# Patient Record
Sex: MALE | Race: BLACK OR AFRICAN AMERICAN
[De-identification: names, ages, dates, MRNs, and addresses within clinical notes are randomized per-mention and may not be internally consistent; named-entity substitution may affect disease eponyms.]

---

## 2019-10-06 ENCOUNTER — HOSPITAL ENCOUNTER (INPATIENT)
Dept: HOSPITAL 61 - ER | Age: 49
LOS: 2 days | Discharge: HOME | DRG: 378 | End: 2019-10-08
Attending: INTERNAL MEDICINE | Admitting: INTERNAL MEDICINE
Payer: SELF-PAY

## 2019-10-06 VITALS — SYSTOLIC BLOOD PRESSURE: 167 MMHG | DIASTOLIC BLOOD PRESSURE: 91 MMHG

## 2019-10-06 VITALS — DIASTOLIC BLOOD PRESSURE: 87 MMHG | SYSTOLIC BLOOD PRESSURE: 170 MMHG

## 2019-10-06 VITALS — SYSTOLIC BLOOD PRESSURE: 145 MMHG | DIASTOLIC BLOOD PRESSURE: 71 MMHG

## 2019-10-06 VITALS — SYSTOLIC BLOOD PRESSURE: 131 MMHG | DIASTOLIC BLOOD PRESSURE: 80 MMHG

## 2019-10-06 VITALS — SYSTOLIC BLOOD PRESSURE: 142 MMHG | DIASTOLIC BLOOD PRESSURE: 74 MMHG

## 2019-10-06 VITALS — DIASTOLIC BLOOD PRESSURE: 90 MMHG | SYSTOLIC BLOOD PRESSURE: 158 MMHG

## 2019-10-06 VITALS — SYSTOLIC BLOOD PRESSURE: 125 MMHG | DIASTOLIC BLOOD PRESSURE: 70 MMHG

## 2019-10-06 VITALS — BODY MASS INDEX: 25.94 KG/M2 | WEIGHT: 185.31 LBS | HEIGHT: 71 IN

## 2019-10-06 VITALS — SYSTOLIC BLOOD PRESSURE: 154 MMHG | DIASTOLIC BLOOD PRESSURE: 91 MMHG

## 2019-10-06 DIAGNOSIS — F12.10: ICD-10-CM

## 2019-10-06 DIAGNOSIS — K26.4: Primary | ICD-10-CM

## 2019-10-06 DIAGNOSIS — D56.9: ICD-10-CM

## 2019-10-06 DIAGNOSIS — Z91.19: ICD-10-CM

## 2019-10-06 DIAGNOSIS — Z87.11: ICD-10-CM

## 2019-10-06 DIAGNOSIS — Z82.0: ICD-10-CM

## 2019-10-06 DIAGNOSIS — E87.2: ICD-10-CM

## 2019-10-06 DIAGNOSIS — D62: ICD-10-CM

## 2019-10-06 DIAGNOSIS — K29.60: ICD-10-CM

## 2019-10-06 DIAGNOSIS — D50.9: ICD-10-CM

## 2019-10-06 DIAGNOSIS — F14.10: ICD-10-CM

## 2019-10-06 LAB
ALBUMIN SERPL-MCNC: 4.3 G/DL (ref 3.4–5)
ALBUMIN/GLOB SERPL: 1 {RATIO} (ref 1–1.7)
ALP SERPL-CCNC: 74 U/L (ref 46–116)
ALT SERPL-CCNC: 13 U/L (ref 16–63)
AMPHETAMINE/METHAMPHETAMINE: (no result)
ANION GAP SERPL CALC-SCNC: 14 MMOL/L (ref 6–14)
ANISOCYTOSIS BLD QL SMEAR: PRESENT
APTT BLD: 24 SEC (ref 24–38)
APTT PPP: YELLOW S
AST SERPL-CCNC: 19 U/L (ref 15–37)
BACTERIA #/AREA URNS HPF: (no result) /HPF
BARBITURATES UR-MCNC: (no result) UG/ML
BASOPHILS # BLD AUTO: 0.1 X10^3/UL (ref 0–0.2)
BASOPHILS # BLD AUTO: 0.1 X10^3/UL (ref 0–0.2)
BASOPHILS NFR BLD: 2 % (ref 0–3)
BASOPHILS NFR BLD: 2 % (ref 0–3)
BENZODIAZ UR-MCNC: (no result) UG/L
BILIRUB SERPL-MCNC: 0.8 MG/DL (ref 0.2–1)
BILIRUB UR QL STRIP: NEGATIVE
BIZZARE CELLS: PRESENT
BUN SERPL-MCNC: 9 MG/DL (ref 8–26)
BUN/CREAT SERPL: 7 (ref 6–20)
CALCIUM SERPL-MCNC: 9.7 MG/DL (ref 8.5–10.1)
CANNABINOIDS UR-MCNC: (no result) UG/L
CHLORIDE SERPL-SCNC: 100 MMOL/L (ref 98–107)
CO2 SERPL-SCNC: 26 MMOL/L (ref 21–32)
COCAINE UR-MCNC: (no result) NG/ML
CREAT SERPL-MCNC: 1.3 MG/DL (ref 0.7–1.3)
DACRYOCYTES BLD QL SMEAR: PRESENT
EOSINOPHIL NFR BLD: 0 % (ref 0–3)
EOSINOPHIL NFR BLD: 0 % (ref 0–3)
EOSINOPHIL NFR BLD: 0 X10^3/UL (ref 0–0.7)
EOSINOPHIL NFR BLD: 0 X10^3/UL (ref 0–0.7)
ERYTHROCYTE [DISTWIDTH] IN BLOOD BY AUTOMATED COUNT: 19.4 % (ref 11.5–14.5)
ERYTHROCYTE [DISTWIDTH] IN BLOOD BY AUTOMATED COUNT: 19.4 % (ref 11.5–14.5)
ERYTHROCYTE [DISTWIDTH] IN BLOOD BY AUTOMATED COUNT: 20.9 % (ref 11.5–14.5)
FIBRINOGEN PPP-MCNC: CLEAR MG/DL
GFR SERPLBLD BASED ON 1.73 SQ M-ARVRAT: 58.2 ML/MIN
GLOBULIN SER-MCNC: 4.3 G/DL (ref 2.2–3.8)
GLUCOSE SERPL-MCNC: 109 MG/DL (ref 70–99)
HCT VFR BLD CALC: 21 % (ref 39–53)
HCT VFR BLD CALC: 22.8 % (ref 39–53)
HCT VFR BLD CALC: 23.1 % (ref 39–53)
HGB BLD-MCNC: 6.3 G/DL (ref 13–17.5)
HGB BLD-MCNC: 7.1 G/DL (ref 13–17.5)
HGB BLD-MCNC: 7.1 G/DL (ref 13–17.5)
HYALINE CASTS #/AREA URNS LPF: (no result) /HPF
HYPOCHROMIA BLD QL SMEAR: PRESENT
LIPASE: 177 U/L (ref 73–393)
LYMPHOCYTES # BLD: 1.2 X10^3/UL (ref 1–4.8)
LYMPHOCYTES # BLD: 1.3 X10^3/UL (ref 1–4.8)
LYMPHOCYTES NFR BLD AUTO: 17 % (ref 24–48)
LYMPHOCYTES NFR BLD AUTO: 17 % (ref 24–48)
MCH RBC QN AUTO: 20 PG (ref 25–35)
MCH RBC QN AUTO: 20 PG (ref 25–35)
MCH RBC QN AUTO: 21 PG (ref 25–35)
MCHC RBC AUTO-ENTMCNC: 30 G/DL (ref 31–37)
MCHC RBC AUTO-ENTMCNC: 31 G/DL (ref 31–37)
MCHC RBC AUTO-ENTMCNC: 31 G/DL (ref 31–37)
MCV RBC AUTO: 65 FL (ref 79–100)
MCV RBC AUTO: 66 FL (ref 79–100)
MCV RBC AUTO: 68 FL (ref 79–100)
METHADONE SERPL-MCNC: (no result) NG/ML
MICROCYTES BLD QL SMEAR: PRESENT
MONO #: 0.6 X10^3/UL (ref 0–1.1)
MONO #: 0.7 X10^3/UL (ref 0–1.1)
MONOCYTES NFR BLD: 10 % (ref 0–9)
MONOCYTES NFR BLD: 8 % (ref 0–9)
NEUT #: 5.2 X10^3/UL (ref 1.8–7.7)
NEUT #: 5.4 X10^3/UL (ref 1.8–7.7)
NEUTROPHILS NFR BLD AUTO: 72 % (ref 31–73)
NEUTROPHILS NFR BLD AUTO: 73 % (ref 31–73)
NITRITE UR QL STRIP: NEGATIVE
OPIATES UR-MCNC: (no result) NG/ML
PCP SERPL-MCNC: (no result) MG/DL
PH UR STRIP: 7.5 [PH]
PLATELET # BLD AUTO: 312 X10^3/UL (ref 140–400)
PLATELET # BLD AUTO: 336 X10^3/UL (ref 140–400)
PLATELET # BLD AUTO: 399 X10^3/UL (ref 140–400)
PLATELET # BLD EST: ADEQUATE 10*3/UL
POLYCHROMASIA BLD QL SMEAR: PRESENT
POTASSIUM SERPL-SCNC: 3.6 MMOL/L (ref 3.5–5.1)
PROT SERPL-MCNC: 8.6 G/DL (ref 6.4–8.2)
PROT UR STRIP-MCNC: NEGATIVE MG/DL
PROTHROMBIN TIME: 13.2 SEC (ref 11.7–14)
RBC # BLD AUTO: 3.18 X10^6/UL (ref 4.3–5.7)
RBC # BLD AUTO: 3.38 X10^6/UL (ref 4.3–5.7)
RBC # BLD AUTO: 3.49 X10^6/UL (ref 4.3–5.7)
RBC #/AREA URNS HPF: 0 /HPF (ref 0–2)
SODIUM SERPL-SCNC: 140 MMOL/L (ref 136–145)
SQUAMOUS #/AREA URNS LPF: (no result) /LPF
UROBILINOGEN UR-MCNC: 1 MG/DL
WBC # BLD AUTO: 7.3 X10^3/UL (ref 4–11)
WBC # BLD AUTO: 7.4 X10^3/UL (ref 4–11)
WBC # BLD AUTO: 8.2 X10^3/UL (ref 4–11)
WBC #/AREA URNS HPF: (no result) /HPF (ref 0–4)

## 2019-10-06 PROCEDURE — 87086 URINE CULTURE/COLONY COUNT: CPT

## 2019-10-06 PROCEDURE — 36415 COLL VENOUS BLD VENIPUNCTURE: CPT

## 2019-10-06 PROCEDURE — 74177 CT ABD & PELVIS W/CONTRAST: CPT

## 2019-10-06 PROCEDURE — 86901 BLOOD TYPING SEROLOGIC RH(D): CPT

## 2019-10-06 PROCEDURE — 86850 RBC ANTIBODY SCREEN: CPT

## 2019-10-06 PROCEDURE — 81001 URINALYSIS AUTO W/SCOPE: CPT

## 2019-10-06 PROCEDURE — 88305 TISSUE EXAM BY PATHOLOGIST: CPT

## 2019-10-06 PROCEDURE — 36430 TRANSFUSION BLD/BLD COMPNT: CPT

## 2019-10-06 PROCEDURE — C9113 INJ PANTOPRAZOLE SODIUM, VIA: HCPCS

## 2019-10-06 PROCEDURE — 83550 IRON BINDING TEST: CPT

## 2019-10-06 PROCEDURE — G0378 HOSPITAL OBSERVATION PER HR: HCPCS

## 2019-10-06 PROCEDURE — P9016 RBC LEUKOCYTES REDUCED: HCPCS

## 2019-10-06 PROCEDURE — 87040 BLOOD CULTURE FOR BACTERIA: CPT

## 2019-10-06 PROCEDURE — 30233N1 TRANSFUSION OF NONAUTOLOGOUS RED BLOOD CELLS INTO PERIPHERAL VEIN, PERCUTANEOUS APPROACH: ICD-10-PCS | Performed by: INTERNAL MEDICINE

## 2019-10-06 PROCEDURE — 85730 THROMBOPLASTIN TIME PARTIAL: CPT

## 2019-10-06 PROCEDURE — 43239 EGD BIOPSY SINGLE/MULTIPLE: CPT

## 2019-10-06 PROCEDURE — 80048 BASIC METABOLIC PNL TOTAL CA: CPT

## 2019-10-06 PROCEDURE — 83540 ASSAY OF IRON: CPT

## 2019-10-06 PROCEDURE — 85025 COMPLETE CBC W/AUTO DIFF WBC: CPT

## 2019-10-06 PROCEDURE — 83605 ASSAY OF LACTIC ACID: CPT

## 2019-10-06 PROCEDURE — 90686 IIV4 VACC NO PRSV 0.5 ML IM: CPT

## 2019-10-06 PROCEDURE — 80053 COMPREHEN METABOLIC PANEL: CPT

## 2019-10-06 PROCEDURE — 86920 COMPATIBILITY TEST SPIN: CPT

## 2019-10-06 PROCEDURE — 85610 PROTHROMBIN TIME: CPT

## 2019-10-06 PROCEDURE — 86900 BLOOD TYPING SEROLOGIC ABO: CPT

## 2019-10-06 PROCEDURE — 88342 IMHCHEM/IMCYTCHM 1ST ANTB: CPT

## 2019-10-06 PROCEDURE — 90471 IMMUNIZATION ADMIN: CPT

## 2019-10-06 PROCEDURE — 93005 ELECTROCARDIOGRAM TRACING: CPT

## 2019-10-06 PROCEDURE — 84484 ASSAY OF TROPONIN QUANT: CPT

## 2019-10-06 PROCEDURE — 96374 THER/PROPH/DIAG INJ IV PUSH: CPT

## 2019-10-06 PROCEDURE — 96375 TX/PRO/DX INJ NEW DRUG ADDON: CPT

## 2019-10-06 PROCEDURE — 96361 HYDRATE IV INFUSION ADD-ON: CPT

## 2019-10-06 PROCEDURE — 83690 ASSAY OF LIPASE: CPT

## 2019-10-06 PROCEDURE — 80307 DRUG TEST PRSMV CHEM ANLYZR: CPT

## 2019-10-06 PROCEDURE — 85027 COMPLETE CBC AUTOMATED: CPT

## 2019-10-06 RX ADMIN — BACITRACIN SCH MLS/HR: 5000 INJECTION, POWDER, FOR SOLUTION INTRAMUSCULAR at 09:41

## 2019-10-06 RX ADMIN — PANTOPRAZOLE SODIUM SCH MG: 40 INJECTION, POWDER, FOR SOLUTION INTRAVENOUS at 17:26

## 2019-10-06 RX ADMIN — BACITRACIN SCH MLS/HR: 5000 INJECTION, POWDER, FOR SOLUTION INTRAMUSCULAR at 21:17

## 2019-10-06 RX ADMIN — MORPHINE SULFATE PRN MG: 4 INJECTION, SOLUTION INTRAMUSCULAR; INTRAVENOUS at 22:29

## 2019-10-06 RX ADMIN — MORPHINE SULFATE PRN MG: 4 INJECTION, SOLUTION INTRAMUSCULAR; INTRAVENOUS at 18:16

## 2019-10-06 RX ADMIN — MORPHINE SULFATE PRN MG: 4 INJECTION, SOLUTION INTRAMUSCULAR; INTRAVENOUS at 12:40

## 2019-10-06 RX ADMIN — BACITRACIN SCH MLS/HR: 5000 INJECTION, POWDER, FOR SOLUTION INTRAMUSCULAR at 20:00

## 2019-10-06 NOTE — PDOC2
GI CONSULT


Reason For Consult:


Abdominal pain/anemia/abnormal imaging





HPI:


HPI:


48 y/o male with one year h/o intermittent LUQ "burning".  Will transiently 

improve with antacids.  Says chronically takes some antisecretories (not PPI I 

think) since diagnosed with ulcer at  ~18 years ago; does not recall mention 

of H.pylori.  History suggests had penetrating ulcer with pancreatitis 

secondary.  Increasing issues over the past 2 weeks.  Worse pc.  Some nausea and

non-bloody emesis.  Denies heartburn or dysphagia.  On CT, apparent gastric wall

thickening, unclearly real or from underdistention.  





No GB, liver history.  Occasional use of tobacco and alcohol. Denies drug use 

(but tox screen positive for THC and cocaine).  Occasional NSAID use.  Typically

free of diarrhea or constipation.  Occasional blood in stool.  Occasional 

"melena".  Wt./appetite OK.  G[facility] positive for ulcers in father.  No prior 

colonoscopy.





On CBC, microcytic anemia.





PMH:


PMH:


Apparently unremarkable otherwise.





FH:


Family History:  No pertinent hx





Social History:


ALCOHOL:  occassional


Drugs:  Cocaine, Marijuana, Other (per tox screen)





ROS:





GEN: Denies fevers, chills, sweats


HEENT: Denies blurred vision, sore throat


CV: Denies chest pain


RESP: Denies shortness of air, cough


GI: Per HPI


: Denies hematuria, dysuria


ENDO: Denies weight changes


NEURO: Denies confusion, dizziness


MSK: Denies weakness, joint pain/swelling


SKIN: Denies jaundice, pruritus





Vitals:


Vitals:





                                   Vital Signs








  Date Time  Temp Pulse Resp B/P (MAP) Pulse Ox O2 Delivery O2 Flow Rate FiO2


 


10/6/19 12:40   18  94 Room Air  


 


10/6/19 11:00 98.2 80  142/74 (96)    





 98.2       











Labs:


Labs:





Laboratory Tests








Test


 10/6/19


06:21 10/6/19


06:30 10/6/19


06:55 10/6/19


10:10


 


Urine Collection Type Unknown    


 


Urine Color Yellow    


 


Urine Clarity Clear    


 


Urine pH 7.5    


 


Urine Specific Gravity 1.020    


 


Urine Protein


 Negative mg/dL


(NEG-TRACE) 


 


 





 


Urine Glucose (UA)


 Negative mg/dL


(NEG) 


 


 





 


Urine Ketones (Stick) 40 mg/dL (NEG)    


 


Urine Blood Negative (NEG)    


 


Urine Nitrite Negative (NEG)    


 


Urine Bilirubin Negative (NEG)    


 


Urine Urobilinogen Dipstick


 1.0 mg/dL (0.2


mg/dL) 


 


 





 


Urine Leukocyte Esterase Trace (NEG)    


 


Urine RBC 0 /HPF (0-2)    


 


Urine WBC 1-4 /HPF (0-4)    


 


Urine Squamous Epithelial


Cells Few /LPF 


 


 


 





 


Urine Bacteria


 Few /HPF


(0-FEW) 


 


 





 


Urine Hyaline Casts Few /HPF    


 


Urine Mucus Mod /LPF    


 


Urine Opiates Screen Neg (NEG)    


 


Urine Methadone Screen Neg (NEG)    


 


Urine Barbiturates Neg (NEG)    


 


Urine Phencyclidine Screen Neg (NEG)    


 


Urine


Amphetamine/Methamphetamine Neg (NEG) 


 


 


 





 


Urine Benzodiazepines Screen Neg (NEG)    


 


Urine Cocaine Screen Pos (NEG)    


 


Urine Cannabinoids Screen Pos (NEG)    


 


Urine Ethyl Alcohol Neg (NEG)    


 


White Blood Count


 


 7.4 x10^3/uL


(4.0-11.0) 


 7.3 x10^3/uL


(4.0-11.0)


 


Red Blood Count


 


 3.49 x10^6/uL


(4.30-5.70) 


 3.18 x10^6/uL


(4.30-5.70)


 


Hemoglobin


 


 7.1 g/dL


(13.0-17.5) 


 6.3 g/dL


(13.0-17.5)


 


Hematocrit


 


 22.8 %


(39.0-53.0) 


 21.0 %


(39.0-53.0)


 


Mean Corpuscular Volume  65 fL ()   66 fL () 


 


Mean Corpuscular Hemoglobin  20 pg (25-35)   20 pg (25-35) 


 


Mean Corpuscular Hemoglobin


Concent 


 31 g/dL


(31-37) 


 30 g/dL


(31-37)


 


Red Cell Distribution Width


 


 19.4 %


(11.5-14.5) 


 19.4 %


(11.5-14.5)


 


Platelet Count


 


 399 x10^3/uL


(140-400) 


 336 x10^3/uL


(140-400)


 


Neutrophils (%) (Auto)  73 % (31-73)   72 % (31-73) 


 


Lymphocytes (%) (Auto)  17 % (24-48)   17 % (24-48) 


 


Monocytes (%) (Auto)  8 % (0-9)   10 % (0-9) 


 


Eosinophils (%) (Auto)  0 % (0-3)   0 % (0-3) 


 


Basophils (%) (Auto)  2 % (0-3)   2 % (0-3) 


 


Neutrophils # (Auto)


 


 5.4 x10^3/uL


(1.8-7.7) 


 5.2 x10^3/uL


(1.8-7.7)


 


Lymphocytes # (Auto)


 


 1.3 x10^3/uL


(1.0-4.8) 


 1.2 x10^3/uL


(1.0-4.8)


 


Monocytes # (Auto)


 


 0.6 x10^3/uL


(0.0-1.1) 


 0.7 x10^3/uL


(0.0-1.1)


 


Eosinophils # (Auto)


 


 0.0 x10^3/uL


(0.0-0.7) 


 0.0 x10^3/uL


(0.0-0.7)


 


Basophils # (Auto)


 


 0.1 x10^3/uL


(0.0-0.2) 


 0.1 x10^3/uL


(0.0-0.2)


 


Platelet Estimate


 


 Adequate


(ADEQUATE) 


 





 


Polychromasia  Present   


 


Hypochromasia  Present   


 


Anisocytosis  Present   


 


Microcytosis  Present   


 


Tear Drop Cells  Present   


 


RBC Morphology Bizarre Forms  Present   


 


Prothrombin Time


 


 13.2 SEC


(11.7-14.0) 


 





 


Prothromb Time International


Ratio 


 1.0 (0.8-1.1) 


 


 





 


Activated Partial


Thromboplast Time 


 24 SEC (24-38) 


 


 





 


Sodium Level


 


 140 mmol/L


(136-145) 


 





 


Potassium Level


 


 3.6 mmol/L


(3.5-5.1) 


 





 


Chloride Level


 


 100 mmol/L


() 


 





 


Carbon Dioxide Level


 


 26 mmol/L


(21-32) 


 





 


Anion Gap  14 (6-14)   


 


Blood Urea Nitrogen  9 mg/dL (8-26)   


 


Creatinine


 


 1.3 mg/dL


(0.7-1.3) 


 





 


Estimated GFR


(Cockcroft-Gault) 


 58.2 


 


 





 


BUN/Creatinine Ratio  7 (6-20)   


 


Glucose Level


 


 109 mg/dL


(70-99) 


 





 


Calcium Level


 


 9.7 mg/dL


(8.5-10.1) 


 





 


Total Bilirubin


 


 0.8 mg/dL


(0.2-1.0) 


 





 


Aspartate Amino Transf


(AST/SGOT) 


 19 U/L (15-37) 


 


 





 


Alanine Aminotransferase


(ALT/SGPT) 


 13 U/L (16-63) 


 


 





 


Alkaline Phosphatase


 


 74 U/L


() 


 





 


Troponin I Quantitative


 


 < 0.017 ng/mL


(0.000-0.055) 


 





 


Total Protein


 


 8.6 g/dL


(6.4-8.2) 


 





 


Albumin


 


 4.3 g/dL


(3.4-5.0) 


 





 


Albumin/Globulin Ratio  1.0 (1.0-1.7)   


 


Lipase


 


 177 U/L


() 


 





 


Lactic Acid Level


 


 


 2.7 mmol/L


(0.4-2.0) 1.8 mmol/L


(0.4-2.0)











Allergies:


Coded Allergies:  


     No Known Drug Allergies (Unverified , 10/6/19)





Medications:





Current Medications








 Medications


  (Trade)  Dose


 Ordered  Sig/Taryn


 Route


 PRN Reason  Start Time


 Stop Time Status Last Admin


Dose Admin


 


 Fentanyl Citrate


  (Fentanyl 2ml


 Vial)  50 mcg  1X  ONCE


 IV


   10/6/19 07:00


 10/6/19 07:01 DC 10/6/19 07:00





 


 Ondansetron HCl


  (Zofran)  4 mg  1X  ONCE


 IV


   10/6/19 07:00


 10/6/19 07:01 DC 10/6/19 07:47





 


 Sodium Chloride  1,000 ml @ 


 1,000 mls/hr  1X  ONCE


 IV


   10/6/19 07:00


 10/6/19 07:59 DC 10/6/19 08:25





 


 Sodium Chloride  1,000 ml @ 


 1,000 mls/hr  1X  ONCE


 IV


   10/6/19 07:00


 10/6/19 07:59 DC 10/6/19 09:42





 


 Iohexol


  (Omnipaque 300


 Mg/ml)  60 ml  1X  ONCE


 IV


   10/6/19 07:15


 10/6/19 07:16 DC 10/6/19 07:15





 


 Fentanyl Citrate


  (Fentanyl 2ml


 Vial)  50 mcg  PRN Q2HR  PRN


 IV


 PAIN  10/6/19 08:00


 10/6/19 12:00 DC 10/6/19 09:46





 


 Sodium Chloride  1,000 ml @ 


 150 mls/hr  Q6H40M


 IV


   10/6/19 07:57


 10/7/19 07:56  10/6/19 09:41





 


 Pantoprazole


 Sodium


  (PROTONIX VIAL


 for IV PUSH)  40 mg  1X  ONCE


 IVP


   10/6/19 08:00


 10/6/19 08:06 DC 10/6/19 12:40





 


 Morphine Sulfate


  (Morphine


 Sulfate)  4 mg  PRN Q2HR  PRN


 IV


 PAIN  10/6/19 09:00


    10/6/19 12:40














Imaging:


Imaging:


ON CT:





FINDINGS:


Heart is normal in size. No pericardial or pleural effusion. Clear lung 


bases. Liver, spleen, gallbladder, pancreas, adrenals and kidneys are 


within normal limits. No enlarged retroperitoneal or pelvic adenopathy. No


free pelvic fluid or ascites. The prostate and seminal vesicles show no 


large mass. No bowel obstruction. Normal appendix. Gastric wall thickening


noted measuring 2.0 cm. Urinary bladder demonstrates no radiopaque stone. 


No pneumoperitoneum. No suspicious bony lesion.


 


IMPRESSION:


1. Wall thickening of the stomach may be secondary to suboptimal 


distention or gastritis.


2. No cholelithiasis area no nephrolithiasis.





PE:





GEN: NAD


HEENT: Atraumatic, PERRLA


LUNGS: CTAB


HEART: RRR, no murmurs


ABD: NABS, S/ND/tender LUQ and near epigastrium, no masses


EXTREMITY: No edema


SKIN: No rashes, no jaundice.  Tattoos.


NEURO/PSYCH: A & O 3





A/P:


A/P:


IMP: LUQ pain; roughly seems peptic.  Abnormal stomach on CT may or may not be 

real.  Hypertrophic gastropathy from H.pylori (or at worse, carcinoma or 

lymphoma) possible if real, especially with h/o ulcer.  


        Microcytic anemia; implies some chronic blood loss if iron deficient 

(checking).  Thalassemia if normal irons a consideration.


        CRC screening, never.





REC: IV PPI


         Continue other.


         Await iron studies.


         EGD tomorrow.


         At some point, colonoscopy.











KEIRY EL MD           Oct 6, 2019 13:01

## 2019-10-06 NOTE — RAD
PQRS Compliance statement:

 

One or more of the following individualized dose reduction techniques were

utilized for this examination:

1. Automated exposure control.

2. Adjustment of the mA and/or kV according to patient size.

3. Use of iterative reconstruction technique.

 

Indication:Epigastric pain for 3 days.

 

TECHNIQUE: CT abdomen and pelvis with IV contrast with multiplanar 

reformats.

 

COMPARISON: None

 

FINDINGS:

Heart is normal in size. No pericardial or pleural effusion. Clear lung 

bases. Liver, spleen, gallbladder, pancreas, adrenals and kidneys are 

within normal limits. No enlarged retroperitoneal or pelvic adenopathy. No

free pelvic fluid or ascites. The prostate and seminal vesicles show no 

large mass. No bowel obstruction. Normal appendix. Gastric wall thickening

noted measuring 2.0 cm. Urinary bladder demonstrates no radiopaque stone. 

No pneumoperitoneum. No suspicious bony lesion.

 

IMPRESSION:

1. Wall thickening of the stomach may be secondary to suboptimal 

distention or gastritis.

2. No cholelithiasis area no nephrolithiasis.

 

Electronically signed by: Josh Robb DO (10/6/2019 7:32 AM) [facility]

## 2019-10-06 NOTE — PHYS DOC
Past Medical History


Past Medical History:  Other


Additional Past Medical Histor:  pancreatitis





Adult General


Chief Complaint


Chief Complaint:  ABDOMINAL PAIN





HPI


HPI





Patient is a 49  year old male who presents with complaining of abdominal pain. 

Patient complaining of epigastric burning pain that started 5 days ago as an 

intermittent pain with radiation to his back associated with nausea and 2 or 3 

episodes of vomiting  per day. Patient states the pain became constant for the 

last 3 days without vomiting. Patient rated his pain 10 over 10 and complaining 

of shortness of breath without chest pain, fever and chills, sick contacts. 

Patient states he had decrease of urine output for the last couple days. Patient

complaining of 2 episodes of nonbloody diarrhea for the last 2 weeks without new

change the last few days. Patient states he had the same pain several years ago 

and diagnosed with pancreatitis. Patient denies using alcohol or drugs.





Review of Systems


Review of Systems





Constitutional: Denies fever or chills []


Eyes: Denies change in visual acuity, redness, or eye pain []


HENT: Denies nasal congestion or sore throat []


Respiratory: Denies cough or shortness of breath []


Cardiovascular: No additional information not addressed in HPI []


GI: Reports abdominal pain, nausea, vomiting,  diarrhea []


: Denies dysuria or hematuria []


Musculoskeletal: Denies back pain or joint pain []


Integument: Denies rash or skin lesions []


Neurologic: Denies headache, focal weakness or sensory changes []


Endocrine: Denies polyuria or polydipsia []





All other systems were reviewed and found to be within normal limits, except as 

documented in this note.





Current Medications


Current Medications





Current Medications








 Medications


  (Trade)  Dose


 Ordered  Sig/Taryn  Start Time


 Stop Time Status Last Admin


Dose Admin


 


 Ceftriaxone Sodium


  (Rocephin)  1 gm  1X  ONCE  10/6/19 07:45


 10/6/19 07:46 DC 10/6/19 17:27


1 GM


 


 Fentanyl Citrate


  (Fentanyl 2ml


 Vial)  50 mcg  1X  ONCE  10/6/19 07:00


 10/6/19 07:01 DC 10/6/19 07:00


50 MCG


 


 Info


  (CONTRAST GIVEN


 -- Rx MONITORING)  1 each  PRN DAILY  PRN  10/6/19 07:30


 10/8/19 07:29   





 


 Iohexol


  (Omnipaque 300


 Mg/ml)  60 ml  1X  ONCE  10/6/19 07:15


 10/6/19 07:16 DC 10/6/19 07:15


60 ML


 


 Ondansetron HCl


  (Zofran)  4 mg  1X  ONCE  10/6/19 07:00


 10/6/19 07:01 DC 10/6/19 07:47


4 MG


 


 Sodium Chloride  1,000 ml @ 


 150 mls/hr  Q6H40M  10/6/19 07:57


 10/7/19 07:56  10/6/19 09:41


150 MLS/HR


 


 Vancomycin HCl  250 ml @ 


 250 mls/hr  1X  ONCE  10/6/19 07:45


 10/6/19 08:44 DC 10/6/19 17:27


250 MLS/HR











Allergies


Allergies





Allergies








Coded Allergies Type Severity Reaction Last Updated Verified


 


  No Known Drug Allergies    10/6/19 No











Physical Exam


Physical Exam








Constitutional: Well developed, well nourished, moderate distress, non-toxic 

appearance. []


HENT: Normocephalic, atraumatic, dry oral mucosa.


Eyes: PERRLA, EOMI, conjunctiva normal, no discharge. [] 


Neck: Normal range of motion, no tenderness, supple, no stridor. [] 


Cardiovascular:Heart rate regular rhythm, no murmur []


Lungs & Thorax:  Bilateral breath sounds clear to auscultation []


Abdomen: Bowel sounds are hyperactive, soft, no tenderness, no masses, no 

pulsatile masses. [] 


Skin: Warm, dry, no erythema, no rash. [] 


Back: No tenderness, no CVA tenderness. [] 


Extremities: No tenderness, no cyanosis, no clubbing, ROM intact, no edema. [] 


Neurologic: Alert and oriented X 3, no focal deficits noted. []


Psychologic: Affect normal, judgement normal, mood normal. []





Current Patient Data


Vital Signs





                                   Vital Signs








  Date Time  Temp Pulse Resp B/P (MAP) Pulse Ox O2 Delivery O2 Flow Rate FiO2


 


10/6/19 07:30  80 20  100 Room Air  


 


10/6/19 07:00    164/101 (122)    


 


10/6/19 06:30 98.7       





 98.7       








Lab Values





                                Laboratory Tests








Test


 10/6/19


06:21 10/6/19


06:30 10/6/19


06:55


 


Urine Collection Type Unknown    


 


Urine Color Yellow    


 


Urine Clarity Clear    


 


Urine pH 7.5    


 


Urine Specific Gravity 1.020    


 


Urine Protein


 Negative mg/dL


(NEG-TRACE) 


 





 


Urine Glucose (UA)


 Negative mg/dL


(NEG) 


 





 


Urine Ketones (Stick)


 40 mg/dL (NEG)


 


 





 


Urine Blood


 Negative (NEG)


 


 





 


Urine Nitrite


 Negative (NEG)


 


 





 


Urine Bilirubin


 Negative (NEG)


 


 





 


Urine Urobilinogen Dipstick


 1.0 mg/dL (0.2


mg/dL) 


 





 


Urine Leukocyte Esterase Trace (NEG)    


 


Urine RBC 0 /HPF (0-2)    


 


Urine WBC


 1-4 /HPF (0-4)


 


 





 


Urine Squamous Epithelial


Cells Few /LPF  


 


 





 


Urine Bacteria


 Few /HPF


(0-FEW) 


 





 


Urine Hyaline Casts Few /HPF    


 


Urine Mucus Mod /LPF    


 


Urine Opiates Screen Neg (NEG)    


 


Urine Methadone Screen Neg (NEG)    


 


Urine Barbiturates Neg (NEG)    


 


Urine Phencyclidine Screen Neg (NEG)    


 


Urine


Amphetamine/Methamphetamine Neg (NEG)  


 


 





 


Urine Benzodiazepines Screen Neg (NEG)    


 


Urine Cocaine Screen Pos (NEG)    


 


Urine Cannabinoids Screen Pos (NEG)    


 


Urine Ethyl Alcohol Neg (NEG)    


 


White Blood Count


 


 7.4 x10^3/uL


(4.0-11.0) 





 


Red Blood Count


 


 3.49 x10^6/uL


(4.30-5.70)  L 





 


Hemoglobin


 


 7.1 g/dL


(13.0-17.5)  L 





 


Hematocrit


 


 22.8 %


(39.0-53.0)  L 





 


Mean Corpuscular Volume


 


 65 fL ()


L 





 


Mean Corpuscular Hemoglobin


 


 20 pg (25-35)


L 





 


Mean Corpuscular Hemoglobin


Concent 


 31 g/dL


(31-37) 





 


Red Cell Distribution Width


 


 19.4 %


(11.5-14.5)  H 





 


Platelet Count


 


 399 x10^3/uL


(140-400) 





 


Neutrophils (%) (Auto)  73 % (31-73)   


 


Lymphocytes (%) (Auto)  17 % (24-48)  L 


 


Monocytes (%) (Auto)  8 % (0-9)   


 


Eosinophils (%) (Auto)  0 % (0-3)   


 


Basophils (%) (Auto)  2 % (0-3)   


 


Neutrophils # (Auto)


 


 5.4 x10^3/uL


(1.8-7.7) 





 


Lymphocytes # (Auto)


 


 1.3 x10^3/uL


(1.0-4.8) 





 


Monocytes # (Auto)


 


 0.6 x10^3/uL


(0.0-1.1) 





 


Eosinophils # (Auto)


 


 0.0 x10^3/uL


(0.0-0.7) 





 


Basophils # (Auto)


 


 0.1 x10^3/uL


(0.0-0.2) 





 


Platelet Estimate


 


 Adequate


(ADEQUATE) 





 


Polychromasia  Present   


 


Hypochromasia  Present   


 


Anisocytosis  Present   


 


Microcytosis  Present   


 


Tear Drop Cells  Present   


 


RBC Morphology Bizarre Forms  Present   


 


Prothrombin Time


 


 13.2 SEC


(11.7-14.0) 





 


Prothrombin Time INR  1.0 (0.8-1.1)   


 


Activated Partial


Thromboplast Time 


 24 SEC (24-38)


 





 


Sodium Level


 


 140 mmol/L


(136-145) 





 


Potassium Level


 


 3.6 mmol/L


(3.5-5.1) 





 


Chloride Level


 


 100 mmol/L


() 





 


Carbon Dioxide Level


 


 26 mmol/L


(21-32) 





 


Anion Gap  14 (6-14)   


 


Blood Urea Nitrogen


 


 9 mg/dL (8-26)


 





 


Creatinine


 


 1.3 mg/dL


(0.7-1.3) 





 


Estimated GFR


(Cockcroft-Gault) 


 58.2  


 





 


BUN/Creatinine Ratio  7 (6-20)   


 


Glucose Level


 


 109 mg/dL


(70-99)  H 





 


Calcium Level


 


 9.7 mg/dL


(8.5-10.1) 





 


Iron Level


 


 7 ug/dL


()  L 





 


Total Iron Binding Capacity


 


 480 ug/dL


(250-450)  H 





 


Iron Saturation  1 % (15-34)  L 


 


Total Bilirubin


 


 0.8 mg/dL


(0.2-1.0) 





 


Aspartate Amino Transferase


(AST) 


 19 U/L (15-37)


 





 


Alanine Aminotransferase (ALT)


 


 13 U/L (16-63)


L 





 


Alkaline Phosphatase


 


 74 U/L


() 





 


Troponin I Quantitative


 


 < 0.017 ng/mL


(0.000-0.055) 





 


Total Protein


 


 8.6 g/dL


(6.4-8.2)  H 





 


Albumin


 


 4.3 g/dL


(3.4-5.0) 





 


Albumin/Globulin Ratio  1.0 (1.0-1.7)   


 


Lipase


 


 177 U/L


() 





 


Lactic Acid Level


 


 


 2.7 mmol/L


(0.4-2.0)  H





                                Laboratory Tests


10/6/19 06:30








                                Laboratory Tests


10/6/19 06:30











EKG


EKG


EKG interpreted by me. EKG at 0 845 showed normal sinus rhythm at rate of 82, 

left tolbert axis, prolonged QT at 422, no acute ST and T-wave abnormalities.





Radiology/Procedures


Radiology/Procedures


[][facility]


                    [address] [place] [address]


                                 [phone]


                                        


                                 IMAGING REPORT





                                     Signed





PATIENT: ROBINSON AGUILA    ACCOUNT: [id number]     MRN#: [id number]


: 1970           LOCATION: ER              AGE: 49


SEX: M                    EXAM DT: 10/06/19         ACCESSION#: [id number]


STATUS: PRE ER            ORD. PHYSICIAN: BANDAR GARCIA MD


REASON: EPIGASTRIC PAIN X 3 DAYS


PROCEDURE: CT ABD PELV W/ IV CONTRST ONLY





PQRS Compliance statement:


 


One or more of the following individualized dose reduction techniques were


utilized for this examination:


1. Automated exposure control.


2. Adjustment of the mA and/or kV according to patient size.


3. Use of iterative reconstruction technique.


 


Indication:Epigastric pain for 3 days.


 


TECHNIQUE: CT abdomen and pelvis with IV contrast with multiplanar 


reformats.


 


COMPARISON: None


 


FINDINGS:


Heart is normal in size. No pericardial or pleural effusion. Clear lung 


bases. Liver, spleen, gallbladder, pancreas, adrenals and kidneys are 


within normal limits. No enlarged retroperitoneal or pelvic adenopathy. No


free pelvic fluid or ascites. The prostate and seminal vesicles show no 


large mass. No bowel obstruction. Normal appendix. Gastric wall thickening


noted measuring 2.0 cm. Urinary bladder demonstrates no radiopaque stone. 


No pneumoperitoneum. No suspicious bony lesion.


 


IMPRESSION:


1. Wall thickening of the stomach may be secondary to suboptimal 


distention or gastritis.


2. No cholelithiasis area no nephrolithiasis.


 


Electronically signed by: Josh Robb DO (10/6/2019 7:32 AM) [id number]














DICTATED and SIGNED BY:     JOSH ROBB DO


DATE:     10/06/19 0732





Course & Med Decision Making


Course & Med Decision Making


Pertinent Labs and Imaging studies reviewed. (See chart for details)





Evaluation of patient in ER showed 49-year-old male patient with complaining of 

abdominal pain for several days and diarrhea for 2 weeks. Patient was pale and 

had tachycardia that improved with rest. Labs showed hemoglobin of 7.1. Patient 

treated with IV fluids for dehydration and pain medication and felt better. CT 

showed thickening of gastric area. There is possibility of gastric malignancy 

with chronic anemia. Patient later on stated  that he had bright red blood in 

his stool for 2 years.Patient requiring admission for further evaluation and 

treatment. Discussed with Dr. Rico who is in agreement with admission. 

Discussed findings and plan with patient and family, who acknowledge und

erstanding and agreement.





Dragon Disclaimer


Dragon Disclaimer


This electronic medical record was generated, in whole or in part, using a voice

 recognition dictation system.





Departure


Departure


Impression:  


   Primary Impression:  


   Abdominal pain


   Additional Impressions:  


   Anemia


   Diarrhea


   Cocaine abuse


   Marijuana abuse


Disposition:   ADMITTED AS INPATIENT (0 758)


Admitting Physician:  ROC (Dr. Rico accepted admission at 0 756)


Condition:  IMPROVED





Date and Time of Reassessment


Date:  Oct 6, 2019


Time:  08:00





Fluid Challenge


Is the fluid challenge complet:  Yes


IBW Target Volume Used:  Yes


BMI > 30:  No





Vital Signs


Vital Signs:





Vital Signs








  Date Time  Temp Pulse Resp B/P (MAP) Pulse Ox O2 Delivery O2 Flow Rate FiO2


 


10/6/19 07:30  80 20  100 Room Air  


 


10/6/19 07:00    164/101 (122)    


 


10/6/19 06:30 98.7       





 98.7       








Temperature Source:  Oral





Respirations


Respiratory Effort:  Normal





Cardiovascular


Pulse Rhythm:  Regular


Heart:  Nml rate, reg. rhythm





Capillary Refil


Capillary Refill:  Rt Hand < 3 seconds





Peripheral Pulse


Pulse Location:  Radial


Pulse Strength:  Normal (2+)


Pulse Assessment Method:  NIBP





Integumentary


Skin Moisture:  Dry





Problem Qualifiers








   Primary Impression:  


   Abdominal pain


   Abdominal location:  epigastric  Qualified Codes:  R10.13 - Epigastric pain


   Additional Impressions:  


   Anemia


   Anemia type:  unspecified type  Qualified Codes:  D64.9 - Anemia, unspecified


   Diarrhea


   Diarrhea type:  unspecified type  Qualified Codes:  R19.7 - Diarrhea, 

   unspecified








BANDAR GARCIA MD              Oct 6, 2019 07:05

## 2019-10-06 NOTE — PDOC1
History and Physical


Date of Admission


Date of Admission


DATE: 10/6/19 


TIME: 08:18





Identification/Chief Complaint


Chief Complaint


Hematemesis





Source


Source:  Patient





History of Present Illness


History of Present Illness


Mr Amezquita is a 49  year old male who presents with complaining of abdominal 

pain. Patient complaining of epigastric burning pain that started 5 days ago as 

an intermittent pain with radiation to his back associated with nausea and 2 or 

3 episodes of vomiting  per day. Patient states the pain became constant for the

last 3 days without vomiting. Patient rated his pain 10 over 10 and complaining 

of shortness of breath without chest pain, fever and chills, sick contacts. 

Patient states he had decrease of urine output for the last couple days. Patient

complaining of 2 episodes of nonbloody diarrhea for the last 2 weeks without new

change the last few days. Patient states he had the same pain several years ago 

and diagnosed with pancreatitis. Patient denies using alcohol or drugs. UDS 

positive for cannabinoids and cocaine





On further review he notes he was diagnosed with ulcer at  ~18 years ago, no 

mention of H.pylori. Denies heartburn or dysphagia.  On CT, apparent gastric 

wall thickening. 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 

infrequently.





FH positive for ulcers in father, seizure in his mother and brother.  No prior 

colonoscopy.





On CBC, microcytic anemia. Hb 7.1. Lactate 2.7





Past Medical History


Cardiovascular:  No pertinent hx


Pulmonary:  No pertinent hx


CENTRAL NERVOUS SYSTEM:  Carpal Tunnel Syndrome


GI:  GI bleed, Gastritis


Heme/Onc:  No pertinent hx


Hepatobiliary:  No pertinent hx


Psych:  No pertinent hx


Rheumatologic:  No pertinent hx


Infectious disease:  No pertinent hx


ENT:  No pertinent hx


Renal/:  No pertinent hx


Endocrine:  No pertinent hx


Dermatology:  No pertinent hx





Past Surgical History


Past Surgical History:  Other (EGD)





Family History


Family History:  Other (Peptic Ulcer disease - father. Seizure disorder in 

Mother and brother)





Social History


Smoke:  No


ALCOHOL:  rare


Drugs:  Cocaine





Current Problem List


Problem List


Problems


Medical Problems:


(1) Abdominal pain


Status: Acute  





(2) Anemia


Status: Acute  





(3) Cocaine abuse


Status: Acute  





(4) Diarrhea


Status: Acute  





(5) Marijuana abuse


Status: Acute  











Current Medications


Current Medications





Current Medications


Fentanyl Citrate (Fentanyl 2ml Vial) 50 mcg 1X  ONCE IV  Last administered on 

10/6/19at 07:00;  Start 10/6/19 at 07:00;  Stop 10/6/19 at 07:01;  Status DC


Ondansetron HCl (Zofran) 4 mg 1X  ONCE IV  Last administered on 10/6/19at 07:47;

 Start 10/6/19 at 07:00;  Stop 10/6/19 at 07:01;  Status DC


Sodium Chloride 1,000 ml @  1,000 mls/hr 1X  ONCE IV ;  Start 10/6/19 at 07:00; 

Stop 10/6/19 at 07:59;  Status DC


Sodium Chloride 1,000 ml @  1,000 mls/hr 1X  ONCE IV ;  Start 10/6/19 at 07:00; 

Stop 10/6/19 at 07:59;  Status DC


Iohexol (Omnipaque 300 Mg/ml) 60 ml 1X  ONCE IV ;  Start 10/6/19 at 07:15;  Stop

10/6/19 at 07:16;  Status DC


Info (CONTRAST GIVEN -- Rx MONITORING) 1 each PRN DAILY  PRN MC SEE COMMENTS;  

Start 10/6/19 at 07:30;  Stop 10/8/19 at 07:29


Ceftriaxone Sodium (Rocephin) 1 gm 1X  ONCE IVP ;  Start 10/6/19 at 07:45;  Stop

10/6/19 at 07:46;  Status DC


Vancomycin HCl 250 ml @  250 mls/hr 1X  ONCE IV ;  Start 10/6/19 at 07:45;  Stop

10/6/19 at 08:44


Ondansetron HCl (Zofran) 4 mg PRN Q8HRS  PRN IV NAUSEA/VOMITING;  Start 10/6/19 

at 08:00;  Stop 10/6/19 at 12:00


Fentanyl Citrate (Fentanyl 2ml Vial) 50 mcg PRN Q2HR  PRN IV PAIN;  Start 

10/6/19 at 08:00;  Stop 10/6/19 at 12:00


Sodium Chloride 1,000 ml @  150 mls/hr Q6H40M IV ;  Start 10/6/19 at 07:57;  

Stop 10/7/19 at 07:56


Pantoprazole Sodium (PROTONIX VIAL for IV PUSH) 40 mg 1X  ONCE IVP ;  Start 

10/6/19 at 08:00;  Stop 10/6/19 at 08:06;  Status DC





Allergies


Allergies:  


Coded Allergies:  


     No Known Drug Allergies (Unverified , 10/6/19)





ROS


General:  YES: Fatigue, Malaise, Appetite; 


   No: Chills, Night Sweats, Other


PSYCHOLOGICAL ROS:  YES: Anxiety; 


   No: Behavioral Disorder, Concentration difficultie, Decreased libido, 

Depression, Disorientation, Hallucinations, Hostility, Irritablity, Memory 

difficulties, Mood Swings, Obsessive thoughts, Physical abuse, Sexual abuse, 

Sleep disturbances, Suicidal ideation, Other


Eyes:  No Blurry vision, No Decreased vision, No Double vision, No Dry eyes, No 

Excessive tearing, No Eye Pain, No Itchy Eyes, No Loss of vision, No 

Photophobia, No Scotomata, No Uses contacts, No Uses glasses, No Other


HEENT:  No: Heacaches, Visual Changes, Hearing change, Nasal congestion, Nasal 

discharge, Oral lesions, Sinus pain, Sore Throat, Epistaxis, Sneezing, Snoring, 

Tinnitus, Vertigo, Vocal changes, Other


ALLERGY AND IMMUNOLOGY:  No: Hives, Insect Bite Sensitivity, Itchy/Watery Eyes, 

Nasal Congestion, Post Nasal Drip, Seasonal Allergies, Other


Hematological and Lymphatic:  No: Bleeding Problems, Blood Clots, Blood 

Transfusions, Brusing, Night Sweats, Pallor, Swollen Lymph Nodes, Other


ENDOCRINE:  No: Breast Changes, Galactorrhea, Hair Pattern Changes, Hot Flashes,

Malaise/lethargy, Mood Swings, Palpitations, Polydipsia/polyuria, Skin Changes, 

Temperature Intolerance, Unexpected Weight Changes, Other


Breast:  No New/Changing Breast Lumps, No Nipple changes, No Nipple discharge, 

No Other


Respiratory:  No: Cough, Hemoptysis, Orthopnea, Pleuritic Pain, Shortness of 

breath, SOB with excertion, Sputum Changes, Stridor, Tachypnea, Wheezing, Other


Cardiovascular:  No Chest Pain, No Palpitations, No Orthopnea, No Paroxysmal 

Noc. Dyspnea, No Edema, No Lt Headedness, No Other


Gastrointestinal:  Yes Nausea, Yes Vomiting, Yes Abdominal Pain, Yes Melena; 


   No Diarrhea, No Constipation, No Hematochezia, No Other


Genitourinary:  No Dysuria, No Frequency, No Incontinence, No Hematuria, No 

Retention, No Discharge, No Urgency, No Pain, No Flank Pain, No Other, No , No ,

No , No , No , No , No 


Musculoskeletal:  No Gait Disturbance, No Joint Pain, No Joint Stiffness, No 

Joint Swelling, No Muscle Pain, No Muscular Weakness, No Pain In:, No Swelling 

In:, No Other


Neurological:  No Behavorial Changes, No Bowel/Bladder ControlChng, No 

Confusion, No Dizziness, No Gait Disturbance, No Headaches, No Impaired 

Coord/balance, No Memory Loss, No Numbness/Tingling, No Seizures, No Speech 

Problems, No Tremors, No Visual Changes, No Weakness, No Other


Skin:  No Dry Skin, No Eczema, No Hair Changes, No Lumps, No Mole Changes, No 

Mottling, No Nail Changes, No Pruritus, No Rash, No Skin Lesion Changes, No 

Other, No Acne





Physical Exam


General:  Alert, Oriented X3, Cooperative, No acute distress


HEENT:  Atraumatic, PERRLA, EOMI, Mucous membr. moist/pink


Lungs:  Clear to auscultation, Normal air movement


Heart:  S1S2, RRR, no gallops, no murmurs


Abdomen:  Normal bowel sounds, Soft, No hepatosplenomegaly, No masses, Other 

(epigastric tenderness)


Rectal Exam:  not examined


Extremities:  No clubbing, No cyanosis, No edema, Normal pulses, No 

tenderness/swelling


Skin:  No rashes, No breakdown, No significant lesion


Neuro:  Normal gait, Normal speech, Strength at 5/5 X4 ext, Normal tone, 

Sensation intact, Cranial nerves 3-12 NL, Reflexes 2+


Psych/Mental Status:  Mental status NL, Mood NL





Vitals


Vitals





Vital Signs








  Date Time  Temp Pulse Resp B/P (MAP) Pulse Ox O2 Delivery O2 Flow Rate FiO2


 


10/6/19 07:00   20     


 


10/6/19 06:30 98.7 82  156/96 (116) 100 Room Air  





 98.7       











Labs


Labs





Laboratory Tests








Test


 10/6/19


06:21 10/6/19


06:30 10/6/19


06:55


 


Urine Collection Type Unknown   


 


Urine Color Yellow   


 


Urine Clarity Clear   


 


Urine pH 7.5   


 


Urine Specific Gravity 1.020   


 


Urine Protein


 Negative mg/dL


(NEG-TRACE) 


 





 


Urine Glucose (UA)


 Negative mg/dL


(NEG) 


 





 


Urine Ketones (Stick) 40 mg/dL (NEG)   


 


Urine Blood Negative (NEG)   


 


Urine Nitrite Negative (NEG)   


 


Urine Bilirubin Negative (NEG)   


 


Urine Urobilinogen Dipstick


 1.0 mg/dL (0.2


mg/dL) 


 





 


Urine Leukocyte Esterase Trace (NEG)   


 


Urine RBC 0 /HPF (0-2)   


 


Urine WBC 1-4 /HPF (0-4)   


 


Urine Squamous Epithelial


Cells Few /LPF 


 


 





 


Urine Bacteria


 Few /HPF


(0-FEW) 


 





 


Urine Hyaline Casts Few /HPF   


 


Urine Mucus Mod /LPF   


 


Urine Opiates Screen Neg (NEG)   


 


Urine Methadone Screen Neg (NEG)   


 


Urine Barbiturates Neg (NEG)   


 


Urine Phencyclidine Screen Neg (NEG)   


 


Urine


Amphetamine/Methamphetamine Neg (NEG) 


 


 





 


Urine Benzodiazepines Screen Neg (NEG)   


 


Urine Cocaine Screen Pos (NEG)   


 


Urine Cannabinoids Screen Pos (NEG)   


 


Urine Ethyl Alcohol Neg (NEG)   


 


White Blood Count


 


 7.4 x10^3/uL


(4.0-11.0) 





 


Red Blood Count


 


 3.49 x10^6/uL


(4.30-5.70) 





 


Hemoglobin


 


 7.1 g/dL


(13.0-17.5) 





 


Hematocrit


 


 22.8 %


(39.0-53.0) 





 


Mean Corpuscular Volume  65 fL ()  


 


Mean Corpuscular Hemoglobin  20 pg (25-35)  


 


Mean Corpuscular Hemoglobin


Concent 


 31 g/dL


(31-37) 





 


Red Cell Distribution Width


 


 19.4 %


(11.5-14.5) 





 


Platelet Count


 


 399 x10^3/uL


(140-400) 





 


Neutrophils (%) (Auto)  73 % (31-73)  


 


Lymphocytes (%) (Auto)  17 % (24-48)  


 


Monocytes (%) (Auto)  8 % (0-9)  


 


Eosinophils (%) (Auto)  0 % (0-3)  


 


Basophils (%) (Auto)  2 % (0-3)  


 


Neutrophils # (Auto)


 


 5.4 x10^3/uL


(1.8-7.7) 





 


Lymphocytes # (Auto)


 


 1.3 x10^3/uL


(1.0-4.8) 





 


Monocytes # (Auto)


 


 0.6 x10^3/uL


(0.0-1.1) 





 


Eosinophils # (Auto)


 


 0.0 x10^3/uL


(0.0-0.7) 





 


Basophils # (Auto)


 


 0.1 x10^3/uL


(0.0-0.2) 





 


Prothrombin Time


 


 13.2 SEC


(11.7-14.0) 





 


Prothromb Time International


Ratio 


 1.0 (0.8-1.1) 


 





 


Activated Partial


Thromboplast Time 


 24 SEC (24-38) 


 





 


Sodium Level


 


 140 mmol/L


(136-145) 





 


Potassium Level


 


 3.6 mmol/L


(3.5-5.1) 





 


Chloride Level


 


 100 mmol/L


() 





 


Carbon Dioxide Level


 


 26 mmol/L


(21-32) 





 


Anion Gap  14 (6-14)  


 


Blood Urea Nitrogen  9 mg/dL (8-26)  


 


Creatinine


 


 1.3 mg/dL


(0.7-1.3) 





 


Estimated GFR


(Cockcroft-Gault) 


 58.2 


 





 


BUN/Creatinine Ratio  7 (6-20)  


 


Glucose Level


 


 109 mg/dL


(70-99) 





 


Calcium Level


 


 9.7 mg/dL


(8.5-10.1) 





 


Total Bilirubin


 


 0.8 mg/dL


(0.2-1.0) 





 


Aspartate Amino Transf


(AST/SGOT) 


 19 U/L (15-37) 


 





 


Alanine Aminotransferase


(ALT/SGPT) 


 13 U/L (16-63) 


 





 


Alkaline Phosphatase


 


 74 U/L


() 





 


Troponin I Quantitative


 


 < 0.017 ng/mL


(0.000-0.055) 





 


Total Protein


 


 8.6 g/dL


(6.4-8.2) 





 


Albumin


 


 4.3 g/dL


(3.4-5.0) 





 


Albumin/Globulin Ratio  1.0 (1.0-1.7)  


 


Lipase


 


 177 U/L


() 





 


Lactic Acid Level


 


 


 2.7 mmol/L


(0.4-2.0)








Laboratory Tests








Test


 10/6/19


06:21 10/6/19


06:30 10/6/19


06:55


 


Urine Collection Type Unknown   


 


Urine Color Yellow   


 


Urine Clarity Clear   


 


Urine pH 7.5   


 


Urine Specific Gravity 1.020   


 


Urine Protein


 Negative mg/dL


(NEG-TRACE) 


 





 


Urine Glucose (UA)


 Negative mg/dL


(NEG) 


 





 


Urine Ketones (Stick) 40 mg/dL (NEG)   


 


Urine Blood Negative (NEG)   


 


Urine Nitrite Negative (NEG)   


 


Urine Bilirubin Negative (NEG)   


 


Urine Urobilinogen Dipstick


 1.0 mg/dL (0.2


mg/dL) 


 





 


Urine Leukocyte Esterase Trace (NEG)   


 


Urine RBC 0 /HPF (0-2)   


 


Urine WBC 1-4 /HPF (0-4)   


 


Urine Squamous Epithelial


Cells Few /LPF 


 


 





 


Urine Bacteria


 Few /HPF


(0-FEW) 


 





 


Urine Hyaline Casts Few /HPF   


 


Urine Mucus Mod /LPF   


 


Urine Opiates Screen Neg (NEG)   


 


Urine Methadone Screen Neg (NEG)   


 


Urine Barbiturates Neg (NEG)   


 


Urine Phencyclidine Screen Neg (NEG)   


 


Urine


Amphetamine/Methamphetamine Neg (NEG) 


 


 





 


Urine Benzodiazepines Screen Neg (NEG)   


 


Urine Cocaine Screen Pos (NEG)   


 


Urine Cannabinoids Screen Pos (NEG)   


 


Urine Ethyl Alcohol Neg (NEG)   


 


White Blood Count


 


 7.4 x10^3/uL


(4.0-11.0) 





 


Red Blood Count


 


 3.49 x10^6/uL


(4.30-5.70) 





 


Hemoglobin


 


 7.1 g/dL


(13.0-17.5) 





 


Hematocrit


 


 22.8 %


(39.0-53.0) 





 


Mean Corpuscular Volume  65 fL ()  


 


Mean Corpuscular Hemoglobin  20 pg (25-35)  


 


Mean Corpuscular Hemoglobin


Concent 


 31 g/dL


(31-37) 





 


Red Cell Distribution Width


 


 19.4 %


(11.5-14.5) 





 


Platelet Count


 


 399 x10^3/uL


(140-400) 





 


Neutrophils (%) (Auto)  73 % (31-73)  


 


Lymphocytes (%) (Auto)  17 % (24-48)  


 


Monocytes (%) (Auto)  8 % (0-9)  


 


Eosinophils (%) (Auto)  0 % (0-3)  


 


Basophils (%) (Auto)  2 % (0-3)  


 


Neutrophils # (Auto)


 


 5.4 x10^3/uL


(1.8-7.7) 





 


Lymphocytes # (Auto)


 


 1.3 x10^3/uL


(1.0-4.8) 





 


Monocytes # (Auto)


 


 0.6 x10^3/uL


(0.0-1.1) 





 


Eosinophils # (Auto)


 


 0.0 x10^3/uL


(0.0-0.7) 





 


Basophils # (Auto)


 


 0.1 x10^3/uL


(0.0-0.2) 





 


Prothrombin Time


 


 13.2 SEC


(11.7-14.0) 





 


Prothromb Time International


Ratio 


 1.0 (0.8-1.1) 


 





 


Activated Partial


Thromboplast Time 


 24 SEC (24-38) 


 





 


Sodium Level


 


 140 mmol/L


(136-145) 





 


Potassium Level


 


 3.6 mmol/L


(3.5-5.1) 





 


Chloride Level


 


 100 mmol/L


() 





 


Carbon Dioxide Level


 


 26 mmol/L


(21-32) 





 


Anion Gap  14 (6-14)  


 


Blood Urea Nitrogen  9 mg/dL (8-26)  


 


Creatinine


 


 1.3 mg/dL


(0.7-1.3) 





 


Estimated GFR


(Cockcroft-Gault) 


 58.2 


 





 


BUN/Creatinine Ratio  7 (6-20)  


 


Glucose Level


 


 109 mg/dL


(70-99) 





 


Calcium Level


 


 9.7 mg/dL


(8.5-10.1) 





 


Total Bilirubin


 


 0.8 mg/dL


(0.2-1.0) 





 


Aspartate Amino Transf


(AST/SGOT) 


 19 U/L (15-37) 


 





 


Alanine Aminotransferase


(ALT/SGPT) 


 13 U/L (16-63) 


 





 


Alkaline Phosphatase


 


 74 U/L


() 





 


Troponin I Quantitative


 


 < 0.017 ng/mL


(0.000-0.055) 





 


Total Protein


 


 8.6 g/dL


(6.4-8.2) 





 


Albumin


 


 4.3 g/dL


(3.4-5.0) 





 


Albumin/Globulin Ratio  1.0 (1.0-1.7)  


 


Lipase


 


 177 U/L


() 





 


Lactic Acid Level


 


 


 2.7 mmol/L


(0.4-2.0)











Images


Images


CT abdomen - Heart is normal in size. No pericardial or pleural effusion. Clear 

lung bases. Liver, spleen, gallbladder, pancreas, adrenals and kidneys are 

within normal limits. No enlarged retroperitoneal or pelvic adenopathy. No free 

pelvic fluid or ascites. The prostate and seminal vesicles show no large mass. 

No bowel obstruction. Normal appendix. Gastric wall thickening noted measuring 

2.0 cm. Urinary bladder demonstrates no radiopaque stone. No pneumoperitoneum. 

No suspicious bony lesion.


 


IMPRESSION:


1. Wall thickening of the stomach may be secondary to suboptimal distention or 

gastritis.


2. No cholelithiasis area no nephrolithiasis.





VTE Prophylaxis Ordered


VTE Prophylaxis Devices:  Yes


VTE Pharmacological Prophylaxi:  Contraindicated





Assessment/Plan


Assessment/Plan


A/P:


Acute epigastric abdominal pain - concern for peptic ulcer disease recurrence. 

IV PPI, consult GI. Pain control. NPO


Acute blood loss anemia - with recent melena and Hb 7.1, likely peptic ulcer 

bleeding. Type and screen. repeat H&H, will transfuse if necessary if Hb < 7


Lactic acidosis - likely 2/2 blood loss, will give IVF and trend


H/o PUD - no known h pylori history





FEN - NPO


PPX - SCDs, IV PPI


FULL CODE


Dispo - inpatient for intractable abdominal pain and obvious GI bleed











GABRIEL THORPE MD         Oct 6, 2019 08:19

## 2019-10-07 VITALS — SYSTOLIC BLOOD PRESSURE: 153 MMHG | DIASTOLIC BLOOD PRESSURE: 89 MMHG

## 2019-10-07 VITALS — SYSTOLIC BLOOD PRESSURE: 129 MMHG | DIASTOLIC BLOOD PRESSURE: 79 MMHG

## 2019-10-07 VITALS — SYSTOLIC BLOOD PRESSURE: 150 MMHG | DIASTOLIC BLOOD PRESSURE: 94 MMHG

## 2019-10-07 VITALS — DIASTOLIC BLOOD PRESSURE: 49 MMHG | SYSTOLIC BLOOD PRESSURE: 93 MMHG

## 2019-10-07 VITALS — SYSTOLIC BLOOD PRESSURE: 150 MMHG | DIASTOLIC BLOOD PRESSURE: 83 MMHG

## 2019-10-07 VITALS — SYSTOLIC BLOOD PRESSURE: 134 MMHG | DIASTOLIC BLOOD PRESSURE: 78 MMHG

## 2019-10-07 VITALS — SYSTOLIC BLOOD PRESSURE: 161 MMHG | DIASTOLIC BLOOD PRESSURE: 107 MMHG

## 2019-10-07 VITALS — SYSTOLIC BLOOD PRESSURE: 122 MMHG | DIASTOLIC BLOOD PRESSURE: 68 MMHG

## 2019-10-07 VITALS — DIASTOLIC BLOOD PRESSURE: 94 MMHG | SYSTOLIC BLOOD PRESSURE: 150 MMHG

## 2019-10-07 VITALS — SYSTOLIC BLOOD PRESSURE: 148 MMHG | DIASTOLIC BLOOD PRESSURE: 81 MMHG

## 2019-10-07 VITALS — SYSTOLIC BLOOD PRESSURE: 73 MMHG

## 2019-10-07 LAB
ERYTHROCYTE [DISTWIDTH] IN BLOOD BY AUTOMATED COUNT: 21.1 % (ref 11.5–14.5)
ERYTHROCYTE [DISTWIDTH] IN BLOOD BY AUTOMATED COUNT: 21.4 % (ref 11.5–14.5)
ERYTHROCYTE [DISTWIDTH] IN BLOOD BY AUTOMATED COUNT: 22.4 % (ref 11.5–14.5)
HCT VFR BLD CALC: 22.5 % (ref 39–53)
HCT VFR BLD CALC: 22.8 % (ref 39–53)
HCT VFR BLD CALC: 23.8 % (ref 39–53)
HGB BLD-MCNC: 7 G/DL (ref 13–17.5)
HGB BLD-MCNC: 7.1 G/DL (ref 13–17.5)
HGB BLD-MCNC: 7.6 G/DL (ref 13–17.5)
MCH RBC QN AUTO: 21 PG (ref 25–35)
MCH RBC QN AUTO: 22 PG (ref 25–35)
MCH RBC QN AUTO: 22 PG (ref 25–35)
MCHC RBC AUTO-ENTMCNC: 31 G/DL (ref 31–37)
MCHC RBC AUTO-ENTMCNC: 31 G/DL (ref 31–37)
MCHC RBC AUTO-ENTMCNC: 32 G/DL (ref 31–37)
MCV RBC AUTO: 69 FL (ref 79–100)
MCV RBC AUTO: 69 FL (ref 79–100)
MCV RBC AUTO: 70 FL (ref 79–100)
PLATELET # BLD AUTO: 273 X10^3/UL (ref 140–400)
PLATELET # BLD AUTO: 286 X10^3/UL (ref 140–400)
PLATELET # BLD AUTO: 293 X10^3/UL (ref 140–400)
RBC # BLD AUTO: 3.26 X10^6/UL (ref 4.3–5.7)
RBC # BLD AUTO: 3.3 X10^6/UL (ref 4.3–5.7)
RBC # BLD AUTO: 3.38 X10^6/UL (ref 4.3–5.7)
WBC # BLD AUTO: 8 X10^3/UL (ref 4–11)
WBC # BLD AUTO: 8.4 X10^3/UL (ref 4–11)
WBC # BLD AUTO: 8.4 X10^3/UL (ref 4–11)

## 2019-10-07 PROCEDURE — 0DB98ZX EXCISION OF DUODENUM, VIA NATURAL OR ARTIFICIAL OPENING ENDOSCOPIC, DIAGNOSTIC: ICD-10-PCS

## 2019-10-07 PROCEDURE — 0DB68ZX EXCISION OF STOMACH, VIA NATURAL OR ARTIFICIAL OPENING ENDOSCOPIC, DIAGNOSTIC: ICD-10-PCS

## 2019-10-07 RX ADMIN — MORPHINE SULFATE PRN MG: 4 INJECTION, SOLUTION INTRAMUSCULAR; INTRAVENOUS at 08:54

## 2019-10-07 RX ADMIN — PANTOPRAZOLE SODIUM SCH MG: 40 INJECTION, POWDER, FOR SOLUTION INTRAVENOUS at 08:54

## 2019-10-07 RX ADMIN — SODIUM CHLORIDE, SODIUM LACTATE, POTASSIUM CHLORIDE, AND CALCIUM CHLORIDE SCH MLS/HR: .6; .31; .03; .02 INJECTION, SOLUTION INTRAVENOUS at 14:28

## 2019-10-07 RX ADMIN — MORPHINE SULFATE PRN MG: 4 INJECTION, SOLUTION INTRAMUSCULAR; INTRAVENOUS at 20:47

## 2019-10-07 RX ADMIN — MORPHINE SULFATE PRN MG: 4 INJECTION, SOLUTION INTRAMUSCULAR; INTRAVENOUS at 17:20

## 2019-10-07 RX ADMIN — SODIUM CHLORIDE, SODIUM LACTATE, POTASSIUM CHLORIDE, AND CALCIUM CHLORIDE SCH MLS/HR: .6; .31; .03; .02 INJECTION, SOLUTION INTRAVENOUS at 20:25

## 2019-10-07 RX ADMIN — BACITRACIN SCH MLS/HR: 5000 INJECTION, POWDER, FOR SOLUTION INTRAMUSCULAR at 01:32

## 2019-10-07 RX ADMIN — MORPHINE SULFATE PRN MG: 4 INJECTION, SOLUTION INTRAMUSCULAR; INTRAVENOUS at 01:31

## 2019-10-07 RX ADMIN — MORPHINE SULFATE PRN MG: 4 INJECTION, SOLUTION INTRAMUSCULAR; INTRAVENOUS at 06:41

## 2019-10-07 NOTE — PDOC
PROGRESS NOTES


History of Present Illness


History of Present Illness





Images


Images


CT abdomen - Heart is normal in size. No pericardial or pleural effusion. Clear 

lung bases. Liver, spleen, gallbladder, pancreas, adrenals and kidneys are 

within normal limits. No enlarged retroperitoneal or pelvic adenopathy. No free 

pelvic fluid or ascites. The prostate and seminal vesicles show no large mass. 

No bowel obstruction. Normal appendix. Gastric wall thickening noted measuring 

2.0 cm. Urinary bladder demonstrates no radiopaque stone. No pneumoperitoneum. 

No suspicious bony lesion.


 


IMPRESSION:


1. Wall thickening of the stomach may be secondary to suboptimal distention or 

gastritis.


2. No cholelithiasis area no nephrolithiasis.





VTE Prophylaxis Ordered


VTE Prophylaxis Devices:  Yes


VTE Pharmacological Prophylaxi:  Contraindicated





Assessment/Plan


Assessment/Plan


A/P:


Acute epigastric abdominal pain - concern for peptic ulcer disease recurrence. 

IV PPI,


 consult GI. Pain control. NPO


Acute blood loss anemia - with recent melena and Hb 7.1, likely peptic ulcer 

bleeding. 


 transfuse if necessary if Hb < 7


Lactic acidosis - likely 2/2 blood loss, will give IVF and trend


H/o PUD - no known h pylori history


cocaine abuse


noncompliance


Hypertrophic gastropathy from H.pylori (vs carcinoma or lymphoma) especially 

with h/o ulcer.  








FEN - NPO


PPX - SCDs, IV PPI


FULL CODE


GI CONSULT


Dispo - inpatient for intractable abdominal pain and obvious GI bleed


consider egd








38 min pt exam, chart review, > 50% of time spent with exam, chart review, pt 

care coordination





Vitals


Vitals





Vital Signs








  Date Time  Temp Pulse Resp B/P (MAP) Pulse Ox O2 Delivery O2 Flow Rate FiO2


 


10/7/19 07:11      Room Air  


 


10/7/19 06:41     97   


 


10/7/19 06:35 97.9 69 18 122/68 (86)    





 97.9       











Physical Exam


General:  Alert, Oriented X3, Cooperative, No acute distress


Heart:  Regular rate, Normal S1


Lungs:  Clear


Abdomen:  Normal bowel sounds, Soft, No hepatosplenomegaly, No masses, Other 

(epigastric tenderness)


Extremities:  No clubbing, No cyanosis, No edema, Normal pulses, No 

tenderness/swelling


Skin:  No rashes, No breakdown, No significant lesion





Labs


LABS





Laboratory Tests








Test


 10/6/19


21:40 10/7/19


04:10


 


White Blood Count


 8.2 x10^3/uL


(4.0-11.0) 8.4 x10^3/uL


(4.0-11.0)


 


Red Blood Count


 3.38 x10^6/uL


(4.30-5.70) 3.30 x10^6/uL


(4.30-5.70)


 


Hemoglobin


 7.1 g/dL


(13.0-17.5) 7.1 g/dL


(13.0-17.5)


 


Hematocrit


 23.1 %


(39.0-53.0) 22.8 %


(39.0-53.0)


 


Mean Corpuscular Volume 68 fL ()  69 fL () 


 


Mean Corpuscular Hemoglobin 21 pg (25-35)  22 pg (25-35) 


 


Mean Corpuscular Hemoglobin


Concent 31 g/dL


(31-37) 31 g/dL


(31-37)


 


Red Cell Distribution Width


 20.9 %


(11.5-14.5) 21.1 %


(11.5-14.5)


 


Platelet Count


 312 x10^3/uL


(140-400) 286 x10^3/uL


(140-400)











Assessment and Plan


Assessmemt and Plan


Problems


Medical Problems:


(1) Abdominal pain


Status: Acute  





(2) Anemia


Status: Acute  





(3) Cocaine abuse


Status: Acute  





(4) Diarrhea


Status: Acute  





(5) Marijuana abuse


Status: Acute  











Comment


Review of Relevant


I have reviewed the following items larissa (where applicable) has been applied.


Labs





Laboratory Tests








Test


 10/6/19


06:21 10/6/19


06:30 10/6/19


06:55 10/6/19


10:10


 


Urine Collection Type Unknown    


 


Urine Color Yellow    


 


Urine Clarity Clear    


 


Urine pH 7.5    


 


Urine Specific Gravity 1.020    


 


Urine Protein


 Negative mg/dL


(NEG-TRACE) 


 


 





 


Urine Glucose (UA)


 Negative mg/dL


(NEG) 


 


 





 


Urine Ketones (Stick) 40 mg/dL (NEG)    


 


Urine Blood Negative (NEG)    


 


Urine Nitrite Negative (NEG)    


 


Urine Bilirubin Negative (NEG)    


 


Urine Urobilinogen Dipstick


 1.0 mg/dL (0.2


mg/dL) 


 


 





 


Urine Leukocyte Esterase Trace (NEG)    


 


Urine RBC 0 /HPF (0-2)    


 


Urine WBC 1-4 /HPF (0-4)    


 


Urine Squamous Epithelial


Cells Few /LPF 


 


 


 





 


Urine Bacteria


 Few /HPF


(0-FEW) 


 


 





 


Urine Hyaline Casts Few /HPF    


 


Urine Mucus Mod /LPF    


 


Urine Opiates Screen Neg (NEG)    


 


Urine Methadone Screen Neg (NEG)    


 


Urine Barbiturates Neg (NEG)    


 


Urine Phencyclidine Screen Neg (NEG)    


 


Urine


Amphetamine/Methamphetamine Neg (NEG) 


 


 


 





 


Urine Benzodiazepines Screen Neg (NEG)    


 


Urine Cocaine Screen Pos (NEG)    


 


Urine Cannabinoids Screen Pos (NEG)    


 


Urine Ethyl Alcohol Neg (NEG)    


 


White Blood Count


 


 7.4 x10^3/uL


(4.0-11.0) 


 7.3 x10^3/uL


(4.0-11.0)


 


Red Blood Count


 


 3.49 x10^6/uL


(4.30-5.70) 


 3.18 x10^6/uL


(4.30-5.70)


 


Hemoglobin


 


 7.1 g/dL


(13.0-17.5) 


 6.3 g/dL


(13.0-17.5)


 


Hematocrit


 


 22.8 %


(39.0-53.0) 


 21.0 %


(39.0-53.0)


 


Mean Corpuscular Volume  65 fL ()   66 fL () 


 


Mean Corpuscular Hemoglobin  20 pg (25-35)   20 pg (25-35) 


 


Mean Corpuscular Hemoglobin


Concent 


 31 g/dL


(31-37) 


 30 g/dL


(31-37)


 


Red Cell Distribution Width


 


 19.4 %


(11.5-14.5) 


 19.4 %


(11.5-14.5)


 


Platelet Count


 


 399 x10^3/uL


(140-400) 


 336 x10^3/uL


(140-400)


 


Neutrophils (%) (Auto)  73 % (31-73)   72 % (31-73) 


 


Lymphocytes (%) (Auto)  17 % (24-48)   17 % (24-48) 


 


Monocytes (%) (Auto)  8 % (0-9)   10 % (0-9) 


 


Eosinophils (%) (Auto)  0 % (0-3)   0 % (0-3) 


 


Basophils (%) (Auto)  2 % (0-3)   2 % (0-3) 


 


Neutrophils # (Auto)


 


 5.4 x10^3/uL


(1.8-7.7) 


 5.2 x10^3/uL


(1.8-7.7)


 


Lymphocytes # (Auto)


 


 1.3 x10^3/uL


(1.0-4.8) 


 1.2 x10^3/uL


(1.0-4.8)


 


Monocytes # (Auto)


 


 0.6 x10^3/uL


(0.0-1.1) 


 0.7 x10^3/uL


(0.0-1.1)


 


Eosinophils # (Auto)


 


 0.0 x10^3/uL


(0.0-0.7) 


 0.0 x10^3/uL


(0.0-0.7)


 


Basophils # (Auto)


 


 0.1 x10^3/uL


(0.0-0.2) 


 0.1 x10^3/uL


(0.0-0.2)


 


Platelet Estimate


 


 Adequate


(ADEQUATE) 


 





 


Polychromasia  Present   


 


Hypochromasia  Present   


 


Anisocytosis  Present   


 


Microcytosis  Present   


 


Tear Drop Cells  Present   


 


RBC Morphology Bizarre Forms  Present   


 


Prothrombin Time


 


 13.2 SEC


(11.7-14.0) 


 





 


Prothromb Time International


Ratio 


 1.0 (0.8-1.1) 


 


 





 


Activated Partial


Thromboplast Time 


 24 SEC (24-38) 


 


 





 


Sodium Level


 


 140 mmol/L


(136-145) 


 





 


Potassium Level


 


 3.6 mmol/L


(3.5-5.1) 


 





 


Chloride Level


 


 100 mmol/L


() 


 





 


Carbon Dioxide Level


 


 26 mmol/L


(21-32) 


 





 


Anion Gap  14 (6-14)   


 


Blood Urea Nitrogen  9 mg/dL (8-26)   


 


Creatinine


 


 1.3 mg/dL


(0.7-1.3) 


 





 


Estimated GFR


(Cockcroft-Gault) 


 58.2 


 


 





 


BUN/Creatinine Ratio  7 (6-20)   


 


Glucose Level


 


 109 mg/dL


(70-99) 


 





 


Calcium Level


 


 9.7 mg/dL


(8.5-10.1) 


 





 


Iron Level


 


 7 ug/dL


() 


 





 


Total Iron Binding Capacity


 


 480 ug/dL


(250-450) 


 





 


Iron Saturation  1 % (15-34)   


 


Total Bilirubin


 


 0.8 mg/dL


(0.2-1.0) 


 





 


Aspartate Amino Transf


(AST/SGOT) 


 19 U/L (15-37) 


 


 





 


Alanine Aminotransferase


(ALT/SGPT) 


 13 U/L (16-63) 


 


 





 


Alkaline Phosphatase


 


 74 U/L


() 


 





 


Troponin I Quantitative


 


 < 0.017 ng/mL


(0.000-0.055) 


 





 


Total Protein


 


 8.6 g/dL


(6.4-8.2) 


 





 


Albumin


 


 4.3 g/dL


(3.4-5.0) 


 





 


Albumin/Globulin Ratio  1.0 (1.0-1.7)   


 


Lipase


 


 177 U/L


() 


 





 


Lactic Acid Level


 


 


 2.7 mmol/L


(0.4-2.0) 1.8 mmol/L


(0.4-2.0)


 


Test


 10/6/19


21:40 10/7/19


04:10 


 





 


White Blood Count


 8.2 x10^3/uL


(4.0-11.0) 8.4 x10^3/uL


(4.0-11.0) 


 





 


Red Blood Count


 3.38 x10^6/uL


(4.30-5.70) 3.30 x10^6/uL


(4.30-5.70) 


 





 


Hemoglobin


 7.1 g/dL


(13.0-17.5) 7.1 g/dL


(13.0-17.5) 


 





 


Hematocrit


 23.1 %


(39.0-53.0) 22.8 %


(39.0-53.0) 


 





 


Mean Corpuscular Volume 68 fL ()  69 fL ()   


 


Mean Corpuscular Hemoglobin 21 pg (25-35)  22 pg (25-35)   


 


Mean Corpuscular Hemoglobin


Concent 31 g/dL


(31-37) 31 g/dL


(31-37) 


 





 


Red Cell Distribution Width


 20.9 %


(11.5-14.5) 21.1 %


(11.5-14.5) 


 





 


Platelet Count


 312 x10^3/uL


(140-400) 286 x10^3/uL


(140-400) 


 











Laboratory Tests








Test


 10/6/19


21:40 10/7/19


04:10


 


White Blood Count


 8.2 x10^3/uL


(4.0-11.0) 8.4 x10^3/uL


(4.0-11.0)


 


Red Blood Count


 3.38 x10^6/uL


(4.30-5.70) 3.30 x10^6/uL


(4.30-5.70)


 


Hemoglobin


 7.1 g/dL


(13.0-17.5) 7.1 g/dL


(13.0-17.5)


 


Hematocrit


 23.1 %


(39.0-53.0) 22.8 %


(39.0-53.0)


 


Mean Corpuscular Volume 68 fL ()  69 fL () 


 


Mean Corpuscular Hemoglobin 21 pg (25-35)  22 pg (25-35) 


 


Mean Corpuscular Hemoglobin


Concent 31 g/dL


(31-37) 31 g/dL


(31-37)


 


Red Cell Distribution Width


 20.9 %


(11.5-14.5) 21.1 %


(11.5-14.5)


 


Platelet Count


 312 x10^3/uL


(140-400) 286 x10^3/uL


(140-400)








Microbiology


10/6/19 Blood Culture - Preliminary, Resulted


          NO GROWTH AFTER 1 DAY


Medications





Current Medications


Fentanyl Citrate (Fentanyl 2ml Vial) 50 mcg 1X  ONCE IV  Last administered on 

10/6/19at 07:00;  Start 10/6/19 at 07:00;  Stop 10/6/19 at 07:01;  Status DC


Ondansetron HCl (Zofran) 4 mg 1X  ONCE IV  Last administered on 10/6/19at 07:47;

 Start 10/6/19 at 07:00;  Stop 10/6/19 at 07:01;  Status DC


Sodium Chloride 1,000 ml @  1,000 mls/hr 1X  ONCE IV  Last administered on 

10/6/19at 08:25;  Start 10/6/19 at 07:00;  Stop 10/6/19 at 07:59;  Status DC


Sodium Chloride 1,000 ml @  1,000 mls/hr 1X  ONCE IV  Last administered on 

10/6/19at 09:42;  Start 10/6/19 at 07:00;  Stop 10/6/19 at 07:59;  Status DC


Iohexol (Omnipaque 300 Mg/ml) 60 ml 1X  ONCE IV  Last administered on 10/6/19at 

07:15;  Start 10/6/19 at 07:15;  Stop 10/6/19 at 07:16;  Status DC


Info (CONTRAST GIVEN -- Rx MONITORING) 1 each PRN DAILY  PRN MC SEE COMMENTS;  

Start 10/6/19 at 07:30;  Stop 10/8/19 at 07:29


Ceftriaxone Sodium (Rocephin) 1 gm 1X  ONCE IVP  Last administered on 10/6/19at 

17:27;  Start 10/6/19 at 07:45;  Stop 10/6/19 at 07:46;  Status DC


Vancomycin HCl 250 ml @  250 mls/hr 1X  ONCE IV  Last administered on 10/6/19at 

17:27;  Start 10/6/19 at 07:45;  Stop 10/6/19 at 08:44;  Status DC


Ondansetron HCl (Zofran) 4 mg PRN Q8HRS  PRN IV NAUSEA/VOMITING;  Start 10/6/19 

at 08:00;  Stop 10/6/19 at 12:00;  Status DC


Fentanyl Citrate (Fentanyl 2ml Vial) 50 mcg PRN Q2HR  PRN IV PAIN Last admini

stered on 10/6/19at 09:46;  Start 10/6/19 at 08:00;  Stop 10/6/19 at 12:00;  

Status DC


Sodium Chloride 1,000 ml @  150 mls/hr Q6H40M IV  Last administered on 10/7/19at

01:32;  Start 10/6/19 at 07:57;  Stop 10/7/19 at 07:56;  Status DC


Pantoprazole Sodium (PROTONIX VIAL for IV PUSH) 40 mg 1X  ONCE IVP  Last 

administered on 10/6/19at 12:40;  Start 10/6/19 at 08:00;  Stop 10/6/19 at 0

8:06;  Status DC


Morphine Sulfate (Morphine Sulfate) 4 mg PRN Q2HR  PRN IV PAIN Last administered

on 10/7/19at 08:54;  Start 10/6/19 at 09:00


Iron Sucrose 500 mg/Sodium Chloride 275 ml @  78.571 mls/ hr 1X  ONCE IV  Last 

administered on 10/6/19at 20:34;  Start 10/6/19 at 15:30;  Stop 10/6/19 at 

18:59;  Status DC


Pantoprazole Sodium (PROTONIX VIAL for IV PUSH) 40 mg DAILYAC IVP  Last 

administered on 10/7/19at 08:54;  Start 10/6/19 at 16:30


Ondansetron HCl (Zofran) 4 mg PRN Q6HRS  PRN IVP NAUSEA/VOMITING;  Start 10/6/19

at 15:30


Vitals/I & O





Vital Sign - Last 24 Hours








 10/6/19 10/6/19 10/6/19 10/6/19





 12:40 13:02 13:10 13:17


 


Temp  98.8  98.7





  98.8  98.7


 


Pulse  80  80


 


Resp 18 16 16 14


 


B/P (MAP)  167/91  154/91


 


Pulse Ox 94   


 


O2 Delivery Room Air  Room Air 


 


    





    





 10/6/19 10/6/19 10/6/19 10/6/19





 14:18 15:00 15:18 18:16


 


Temp 98.1 98.2 98.2 





 98.1 98.2 98.2 


 


Pulse 79 88 82 


 


Resp 14 16 16 14


 


B/P (MAP) 145/71 158/90 (112) 158/90 


 


Pulse Ox    99


 


O2 Delivery  Room Air  Room Air


 


    





    





 10/6/19 10/6/19 10/6/19 10/6/19





 18:46 19:00 20:00 22:29


 


Temp  98.4  





  98.4  


 


Pulse  79  


 


Resp  17  


 


B/P (MAP)  131/80 (97)  


 


Pulse Ox  97  


 


O2 Delivery Room Air Room Air Room Air Room Air


 


    





    





 10/6/19 10/7/19 10/7/19 10/7/19





 23:02 01:31 02:23 03:00


 


Temp 98.1   97.9





 98.1   97.9


 


Pulse 75   85


 


Resp 18   16


 


B/P (MAP) 125/70 (88)   129/79 (96)


 


Pulse Ox 100 100 100 96


 


O2 Delivery Room Air Room Air Room Air Room Air


 


    





    





 10/7/19 10/7/19 10/7/19 





 06:35 06:41 07:11 


 


Temp 97.9   





 97.9   


 


Pulse 69   


 


Resp 18   


 


B/P (MAP) 122/68 (86)   


 


Pulse Ox 97 97  


 


O2 Delivery Room Air Room Air Room Air 














Intake and Output   


 


 10/6/19 10/6/19 10/7/19





 15:00 23:00 07:00


 


Intake Total 323 ml 100 ml 0 ml


 


Balance 323 ml 100 ml 0 ml

















YEHUDA SOLO MD           Oct 7, 2019 11:15

## 2019-10-07 NOTE — EKG
[facility]

              [address] [place] [address]

Test Date:    2019-10-06               Test Time:    06:45:14

Pat Name:     ROBINSON AGUILA            Department:   

Patient ID:   [id number]           Room:         572 1

Gender:       M                        Technician:   

:          [id number]               Requested By: BANDAR GARCIA

Order Number: [id number]           Reading MD:   Tom Pinon MD

                                 Measurements

Intervals                              Axis          

Rate:         82                       P:            40

MO:           142                      QRS:          -10

QRSD:         78                       T:            57

QT:           422                                    

QTc:          496                                    

                           Interpretive Statements

SINUS RHYTHM



NON-SPECIFIC ST/T CHANGES

Electronically Signed On 10- 9:56:30 CDT by Tom Pinon MD

## 2019-10-07 NOTE — NUR
SW following pt for dc planning. Chart reviewed and discussed with RN. Pt lives at home and 
is self pay. GI following. No dc recommendation noted at this time. HCFS to follow to verify 
insurance status. Will continue to follow as needed.

## 2019-10-07 NOTE — PDOC4
PROCEDURE


Procedure


EGD/biopsies





Indication: abdominal pain/anemia/abnormal CT





Meds: per anesthesia





Findings:





E--Normal.  GEJ at 41cm.


G--Folds normal and pliable, not pathologic.  Biopsies antrum.


D--Deep scar/shallow active ulcer, bulb.  Biopsies second portion.





Dasia. well.





IMP: DU/scar


        Suspect will have active gastritis +/- H.pylori seen.





REC: PPI


         Await path.


         OK to feed.











KEIRY EL MD           Oct 7, 2019 14:54

## 2019-10-08 VITALS — SYSTOLIC BLOOD PRESSURE: 158 MMHG | DIASTOLIC BLOOD PRESSURE: 96 MMHG

## 2019-10-08 VITALS — DIASTOLIC BLOOD PRESSURE: 99 MMHG | SYSTOLIC BLOOD PRESSURE: 144 MMHG

## 2019-10-08 VITALS — SYSTOLIC BLOOD PRESSURE: 109 MMHG | DIASTOLIC BLOOD PRESSURE: 63 MMHG

## 2019-10-08 LAB
ANION GAP SERPL CALC-SCNC: 10 MMOL/L (ref 6–14)
BASOPHILS # BLD AUTO: 0.1 X10^3/UL (ref 0–0.2)
BASOPHILS NFR BLD: 2 % (ref 0–3)
BUN SERPL-MCNC: 4 MG/DL (ref 8–26)
CALCIUM SERPL-MCNC: 8.9 MG/DL (ref 8.5–10.1)
CHLORIDE SERPL-SCNC: 108 MMOL/L (ref 98–107)
CO2 SERPL-SCNC: 27 MMOL/L (ref 21–32)
CREAT SERPL-MCNC: 1 MG/DL (ref 0.7–1.3)
EOSINOPHIL NFR BLD: 0.2 X10^3/UL (ref 0–0.7)
EOSINOPHIL NFR BLD: 2 % (ref 0–3)
ERYTHROCYTE [DISTWIDTH] IN BLOOD BY AUTOMATED COUNT: 22.7 % (ref 11.5–14.5)
ERYTHROCYTE [DISTWIDTH] IN BLOOD BY AUTOMATED COUNT: 22.9 % (ref 11.5–14.5)
GFR SERPLBLD BASED ON 1.73 SQ M-ARVRAT: 95.3 ML/MIN
GLUCOSE SERPL-MCNC: 87 MG/DL (ref 70–99)
HCT VFR BLD CALC: 24.3 % (ref 39–53)
HCT VFR BLD CALC: 26.2 % (ref 39–53)
HGB BLD-MCNC: 7.8 G/DL (ref 13–17.5)
HGB BLD-MCNC: 8.2 G/DL (ref 13–17.5)
LYMPHOCYTES # BLD: 1.9 X10^3/UL (ref 1–4.8)
LYMPHOCYTES NFR BLD AUTO: 24 % (ref 24–48)
MCH RBC QN AUTO: 22 PG (ref 25–35)
MCH RBC QN AUTO: 23 PG (ref 25–35)
MCHC RBC AUTO-ENTMCNC: 31 G/DL (ref 31–37)
MCHC RBC AUTO-ENTMCNC: 32 G/DL (ref 31–37)
MCV RBC AUTO: 71 FL (ref 79–100)
MCV RBC AUTO: 71 FL (ref 79–100)
MONO #: 0.8 X10^3/UL (ref 0–1.1)
MONOCYTES NFR BLD: 10 % (ref 0–9)
NEUT #: 5 X10^3/UL (ref 1.8–7.7)
NEUTROPHILS NFR BLD AUTO: 62 % (ref 31–73)
PLATELET # BLD AUTO: 266 X10^3/UL (ref 140–400)
PLATELET # BLD AUTO: 272 X10^3/UL (ref 140–400)
POTASSIUM SERPL-SCNC: 3.3 MMOL/L (ref 3.5–5.1)
RBC # BLD AUTO: 3.42 X10^6/UL (ref 4.3–5.7)
RBC # BLD AUTO: 3.69 X10^6/UL (ref 4.3–5.7)
SODIUM SERPL-SCNC: 145 MMOL/L (ref 136–145)
WBC # BLD AUTO: 7.3 X10^3/UL (ref 4–11)
WBC # BLD AUTO: 8.1 X10^3/UL (ref 4–11)

## 2019-10-08 RX ADMIN — MORPHINE SULFATE PRN MG: 4 INJECTION, SOLUTION INTRAMUSCULAR; INTRAVENOUS at 01:48

## 2019-10-08 RX ADMIN — MORPHINE SULFATE PRN MG: 4 INJECTION, SOLUTION INTRAMUSCULAR; INTRAVENOUS at 07:46

## 2019-10-08 NOTE — DISCH
DISCHARGE INSTRUCTIONS


Condition on Discharge


Condition on Discharge:  Stable





Activity After Discharge


Activity Instructions for Disc:  Activity as tolerated


Lifting Instructions after Dis:  No heavy lifting, No pulling or pushing


Driving Instructions after Dis:  Do not drive today





Diet after Discharge


Diet after Discharge:  Regular





Checks after Discharge


Checks after discharge:  Check blood press - daily





Contacting the DR. after DC


Call your doctor for:  If your condition worsens











YEHUDA SOLO MD           Oct 8, 2019 13:26

## 2019-10-08 NOTE — PDOC3
Discharge Summary


Date of Admission:  Oct 6, 2019


Date of Discharge:  Oct 8, 2019


Follow-Up:  3-5 days


Admitting Diagnosis comment:





DISCHARGE DX ================











A/P:


Acute epigastric abdominal pain - ///peptic ulcer disease recurrence. IV PPI,


 consult GI. Pain control. NPO


Acute blood loss anemia - with recent melena and Hb 7.1, likely peptic ulcer 

bleeding. 


 transfuse if necessary if Hb < 7


Lactic acidosis - likely 2/2 blood loss, will give IVF and trend


H/o PUD - no known h pylori history


cocaine abuse


noncompliance


Hypertrophic gastropathy from H.pylori (vs carcinoma or lymphoma) especially 

with h/o ulcer.  








FEN - NPO


PPX - SCDs, IV PPI


FULL CODE


GI CONSULT


Dispo - inpatient for intractable abdominal pain and obvious GI bleed


egd








Indication: abdominal pain/anemia/abnormal CT





Meds: per anesthesia





Findings:





E--Normal.  GEJ at 41cm.


G--Folds normal and pliable, not pathologic.  Biopsies antrum.


D--Deep scar/shallow active ulcer, bulb.  Biopsies second portion.





Dasia. well.





IMP: DU/scar


        Suspect will have active gastritis +/- H.pylori seen.





REC: PPI


         Await path.


         OK to feed.











KEIRY EL MD 


10/07 














34 min pt exam, chart review D/C PLANNING , > 50% of time spent with exam, chart

review, pt care coordination





Vitals


Vitals





Vital Signs








  Date Time  Temp Pulse Resp B/P (MAP) Pulse Ox O2 Delivery O2 Flow Rate FiO2


 


10/8/19 08:00      Room Air  


 


10/8/19 07:00 98.0 70 16 158/96 (116) 100   





 98.0       











Physical Exam


General:  Alert, Oriented X3, Cooperative, No acute distress


Heart:  Regular rate, Normal S1


Lungs:  Clear


Abdomen:  Normal bowel sounds, Soft, No hepatosplenomegaly, No masses, 


Extremities:  No clubbing, No cyanosis, No edema, Normal pulses, No tenderness

/swelling


Skin:  No rashes, No breakdown, No significant lesion


FINAL DIAGNOSIS


Problems


Medical Problems:


(1) Abdominal pain


Status: Acute  





(2) Anemia


Status: Acute  





(3) Cocaine abuse


Status: Acute  





(4) Diarrhea


Status: Acute  





(5) Marijuana abuse


Status: Acute  








Brief Hospital Course


Mr. Amezquita  is a 49 old [sex] who presented with [GI BLEEDING  ]


CONDITION AT DISCHARGE:  Improved


Discharge Medications





Current Medications


Fentanyl Citrate (Fentanyl 2ml Vial) 50 mcg 1X  ONCE IV  Last administered on 

10/6/19at 07:00;  Start 10/6/19 at 07:00;  Stop 10/6/19 at 07:01;  Status DC


Ondansetron HCl (Zofran) 4 mg 1X  ONCE IV  Last administered on 10/6/19at 07:47;

 Start 10/6/19 at 07:00;  Stop 10/6/19 at 07:01;  Status DC


Sodium Chloride 1,000 ml @  1,000 mls/hr 1X  ONCE IV  Last administered on 

10/6/19at 08:25;  Start 10/6/19 at 07:00;  Stop 10/6/19 at 07:59;  Status DC


Sodium Chloride 1,000 ml @  1,000 mls/hr 1X  ONCE IV  Last administered on 

10/6/19at 09:42;  Start 10/6/19 at 07:00;  Stop 10/6/19 at 07:59;  Status DC


Iohexol (Omnipaque 300 Mg/ml) 60 ml 1X  ONCE IV  Last administered on 10/6/19at 

07:15;  Start 10/6/19 at 07:15;  Stop 10/6/19 at 07:16;  Status DC


Info (CONTRAST GIVEN -- Rx MONITORING) 1 each PRN DAILY  PRN MC SEE COMMENTS;  

Start 10/6/19 at 07:30;  Stop 10/8/19 at 07:29;  Status DC


Ceftriaxone Sodium (Rocephin) 1 gm 1X  ONCE IVP  Last administered on 10/6/19at 

17:27;  Start 10/6/19 at 07:45;  Stop 10/6/19 at 07:46;  Status DC


Vancomycin HCl 250 ml @  250 mls/hr 1X  ONCE IV  Last administered on 10/6/19at 

17:27;  Start 10/6/19 at 07:45;  Stop 10/6/19 at 08:44;  Status DC


Ondansetron HCl (Zofran) 4 mg PRN Q8HRS  PRN IV NAUSEA/VOMITING;  Start 10/6/19 

at 08:00;  Stop 10/6/19 at 12:00;  Status DC


Fentanyl Citrate (Fentanyl 2ml Vial) 50 mcg PRN Q2HR  PRN IV PAIN Last admi

nistered on 10/6/19at 09:46;  Start 10/6/19 at 08:00;  Stop 10/6/19 at 12:00;  

Status DC


Sodium Chloride 1,000 ml @  150 mls/hr Q6H40M IV  Last administered on 10/7/19at

01:32;  Start 10/6/19 at 07:57;  Stop 10/7/19 at 07:56;  Status DC


Pantoprazole Sodium (PROTONIX VIAL for IV PUSH) 40 mg 1X  ONCE IVP  Last 

administered on 10/6/19at 12:40;  Start 10/6/19 at 08:00;  Stop 10/6/19 at 

08:06;  Status DC


Morphine Sulfate (Morphine Sulfate) 4 mg PRN Q2HR  PRN IV PAIN Last administered

on 10/8/19at 07:46;  Start 10/6/19 at 09:00


Iron Sucrose 500 mg/Sodium Chloride 275 ml @  78.571 mls/ hr 1X  ONCE IV  Last 

administered on 10/6/19at 20:34;  Start 10/6/19 at 15:30;  Stop 10/6/19 at 

18:59;  Status DC


Pantoprazole Sodium (PROTONIX VIAL for IV PUSH) 40 mg DAILYAC IVP  Last 

administered on 10/7/19at 08:54;  Start 10/6/19 at 16:30;  Stop 10/7/19 at 

14:56;  Status DC


Ondansetron HCl (Zofran) 4 mg PRN Q6HRS  PRN IVP NAUSEA/VOMITING;  Start 10/6/19

at 15:30


Midazolam HCl (Versed) 2 mg PRN 1X  PRN IV PRIOR TO PROCEDURE;  Start 10/7/19 at

14:30;  Stop 10/8/19 at 14:29


Fentanyl Citrate (Fentanyl 2ml Vial) 25 mcg PRN Q5MIN  PRN IV X 2 DOSES FOR 

PAIN;  Start 10/7/19 at 14:30;  Stop 10/8/19 at 14:29


Fentanyl Citrate (Fentanyl 2ml Vial) 50 mcg PRN Q5MIN  PRN IV X 2 DOSES FOR 

PAIN;  Start 10/7/19 at 14:30;  Stop 10/8/19 at 14:29


Ringer's Solution 1,000 ml @  125 mls/hr Q8H IV ;  Start 10/7/19 at 14:28;  Stop

10/8/19 at 02:27;  Status DC


Lidocaine HCl (Xylocaine-Mpf 1% 2ml Vial) 2 ml 1X PRN  PRN ID IV START;  Start 

10/7/19 at 14:30;  Stop 10/8/19 at 14:29


Propofol 40 ml @ As Directed STK-MED ONCE IV ;  Start 10/7/19 at 14:37;  Stop 

10/7/19 at 14:37;  Status DC


Lidocaine HCl (Lidocaine Pf 2% Vial) 5 ml STK-MED ONCE .ROUTE ;  Start 10/7/19 

at 14:37;  Stop 10/7/19 at 14:37;  Status DC


Pantoprazole Sodium (Protonix) 40 mg DAILYAC PO  Last administered on 10/8/19at 

07:45;  Start 10/8/19 at 07:30


Influenza Virus Vaccine Quadrival (Afluria Quad 2019-20 (3yr Up) Syringe) 0.5 ml

ONCE ONCE VAX IM  Last administered on 10/8/19at 11:32;  Start 10/8/19 at 12:00;

 Stop 10/8/19 at 12:01;  Status DC


Acetaminophen/ Hydrocodone Bitart (Lortab 5/325) 1 tab PRN Q4HRS  PRN PO PAIN 

Last administered on 10/8/19at 10:00;  Start 10/8/19 at 09:45


Ferrous Sulfate (Feosol) 325 mg BIDWMEALS PO ;  Start 10/8/19 at 17:00


Polyethylene Glycol (miraLAX PACKET) 17 gm PRN DAILY  PRN PO CONSTIPATION;  

Start 10/8/19 at 11:15


Vital Signs





Vital Signs








  Date Time  Temp Pulse Resp B/P (MAP) Pulse Ox O2 Delivery O2 Flow Rate FiO2


 


10/8/19 11:00 98.0 72 14 109/63 (78) 99 Room Air  





 98.0       








Labs





Laboratory Tests








Test


 10/6/19


21:40 10/7/19


04:10 10/7/19


13:50 10/7/19


22:20


 


White Blood Count


 8.2 x10^3/uL


(4.0-11.0) 8.4 x10^3/uL


(4.0-11.0) 8.4 x10^3/uL


(4.0-11.0) 8.0 x10^3/uL


(4.0-11.0)


 


Red Blood Count


 3.38 x10^6/uL


(4.30-5.70) 3.30 x10^6/uL


(4.30-5.70) 3.26 x10^6/uL


(4.30-5.70) 3.38 x10^6/uL


(4.30-5.70)


 


Hemoglobin


 7.1 g/dL


(13.0-17.5) 7.1 g/dL


(13.0-17.5) 7.0 g/dL


(13.0-17.5) 7.6 g/dL


(13.0-17.5)


 


Hematocrit


 23.1 %


(39.0-53.0) 22.8 %


(39.0-53.0) 22.5 %


(39.0-53.0) 23.8 %


(39.0-53.0)


 


Mean Corpuscular Volume 68 fL ()  69 fL ()  69 fL ()  70 fL 

() 


 


Mean Corpuscular Hemoglobin 21 pg (25-35)  22 pg (25-35)  21 pg (25-35)  22 pg 

(25-35) 


 


Mean Corpuscular Hemoglobin


Concent 31 g/dL


(31-37) 31 g/dL


(31-37) 31 g/dL


(31-37) 32 g/dL


(31-37)


 


Red Cell Distribution Width


 20.9 %


(11.5-14.5) 21.1 %


(11.5-14.5) 21.4 %


(11.5-14.5) 22.4 %


(11.5-14.5)


 


Platelet Count


 312 x10^3/uL


(140-400) 286 x10^3/uL


(140-400) 293 x10^3/uL


(140-400) 273 x10^3/uL


(140-400)


 


Test


 10/8/19


03:48 


 


 





 


White Blood Count


 8.1 x10^3/uL


(4.0-11.0) 


 


 





 


Red Blood Count


 3.42 x10^6/uL


(4.30-5.70) 


 


 





 


Hemoglobin


 7.8 g/dL


(13.0-17.5) 


 


 





 


Hematocrit


 24.3 %


(39.0-53.0) 


 


 





 


Mean Corpuscular Volume 71 fL ()    


 


Mean Corpuscular Hemoglobin 23 pg (25-35)    


 


Mean Corpuscular Hemoglobin


Concent 32 g/dL


(31-37) 


 


 





 


Red Cell Distribution Width


 22.9 %


(11.5-14.5) 


 


 





 


Platelet Count


 266 x10^3/uL


(140-400) 


 


 





 


Neutrophils (%) (Auto) 62 % (31-73)    


 


Lymphocytes (%) (Auto) 24 % (24-48)    


 


Monocytes (%) (Auto) 10 % (0-9)    


 


Eosinophils (%) (Auto) 2 % (0-3)    


 


Basophils (%) (Auto) 2 % (0-3)    


 


Neutrophils # (Auto)


 5.0 x10^3/uL


(1.8-7.7) 


 


 





 


Lymphocytes # (Auto)


 1.9 x10^3/uL


(1.0-4.8) 


 


 





 


Monocytes # (Auto)


 0.8 x10^3/uL


(0.0-1.1) 


 


 





 


Eosinophils # (Auto)


 0.2 x10^3/uL


(0.0-0.7) 


 


 





 


Basophils # (Auto)


 0.1 x10^3/uL


(0.0-0.2) 


 


 





 


Sodium Level


 145 mmol/L


(136-145) 


 


 





 


Potassium Level


 3.3 mmol/L


(3.5-5.1) 


 


 





 


Chloride Level


 108 mmol/L


() 


 


 





 


Carbon Dioxide Level


 27 mmol/L


(21-32) 


 


 





 


Anion Gap 10 (6-14)    


 


Blood Urea Nitrogen 4 mg/dL (8-26)    


 


Creatinine


 1.0 mg/dL


(0.7-1.3) 


 


 





 


Estimated GFR


(Cockcroft-Gault) 95.3 


 


 


 





 


Glucose Level


 87 mg/dL


(70-99) 


 


 





 


Calcium Level


 8.9 mg/dL


(8.5-10.1) 


 


 











Laboratory Tests








Test


 10/7/19


13:50 10/7/19


22:20 10/8/19


03:48


 


White Blood Count


 8.4 x10^3/uL


(4.0-11.0) 8.0 x10^3/uL


(4.0-11.0) 8.1 x10^3/uL


(4.0-11.0)


 


Red Blood Count


 3.26 x10^6/uL


(4.30-5.70) 3.38 x10^6/uL


(4.30-5.70) 3.42 x10^6/uL


(4.30-5.70)


 


Hemoglobin


 7.0 g/dL


(13.0-17.5) 7.6 g/dL


(13.0-17.5) 7.8 g/dL


(13.0-17.5)


 


Hematocrit


 22.5 %


(39.0-53.0) 23.8 %


(39.0-53.0) 24.3 %


(39.0-53.0)


 


Mean Corpuscular Volume 69 fL ()  70 fL ()  71 fL () 


 


Mean Corpuscular Hemoglobin 21 pg (25-35)  22 pg (25-35)  23 pg (25-35) 


 


Mean Corpuscular Hemoglobin


Concent 31 g/dL


(31-37) 32 g/dL


(31-37) 32 g/dL


(31-37)


 


Red Cell Distribution Width


 21.4 %


(11.5-14.5) 22.4 %


(11.5-14.5) 22.9 %


(11.5-14.5)


 


Platelet Count


 293 x10^3/uL


(140-400) 273 x10^3/uL


(140-400) 266 x10^3/uL


(140-400)


 


Neutrophils (%) (Auto)   62 % (31-73) 


 


Lymphocytes (%) (Auto)   24 % (24-48) 


 


Monocytes (%) (Auto)   10 % (0-9) 


 


Eosinophils (%) (Auto)   2 % (0-3) 


 


Basophils (%) (Auto)   2 % (0-3) 


 


Neutrophils # (Auto)


 


 


 5.0 x10^3/uL


(1.8-7.7)


 


Lymphocytes # (Auto)


 


 


 1.9 x10^3/uL


(1.0-4.8)


 


Monocytes # (Auto)


 


 


 0.8 x10^3/uL


(0.0-1.1)


 


Eosinophils # (Auto)


 


 


 0.2 x10^3/uL


(0.0-0.7)


 


Basophils # (Auto)


 


 


 0.1 x10^3/uL


(0.0-0.2)


 


Sodium Level


 


 


 145 mmol/L


(136-145)


 


Potassium Level


 


 


 3.3 mmol/L


(3.5-5.1)


 


Chloride Level


 


 


 108 mmol/L


()


 


Carbon Dioxide Level


 


 


 27 mmol/L


(21-32)


 


Anion Gap   10 (6-14) 


 


Blood Urea Nitrogen   4 mg/dL (8-26) 


 


Creatinine


 


 


 1.0 mg/dL


(0.7-1.3)


 


Estimated GFR


(Cockcroft-Gault) 


 


 95.3 





 


Glucose Level


 


 


 87 mg/dL


(70-99)


 


Calcium Level


 


 


 8.9 mg/dL


(8.5-10.1)








Allergies





                                    Allergies








Coded Allergies Type Severity Reaction Last Updated Verified


 


  No Known Drug Allergies    10/7/19 No








Disposition/Orders:  D/C to Home











YEHUDA SOLO MD           Oct 8, 2019 13:24

## 2019-10-08 NOTE — PDOC
Subjective:


Subjective:


Drowsy - tells me no n/v, no abd pain, and has stooled.





Objective:


Objective:


D/w nurse - c/o abd pain this morning, got morphine and Lortab.  Tolerating PO. 

?DC soon


Vital Signs:





                                   Vital Signs








  Date Time  Temp Pulse Resp B/P (MAP) Pulse Ox O2 Delivery O2 Flow Rate FiO2


 


10/8/19 08:00      Room Air  


 


10/8/19 07:00 98.0 70 16 158/96 (116) 100   





 98.0       








Labs:





Laboratory Tests








Test


 10/7/19


13:50 10/7/19


22:20 10/8/19


03:48


 


White Blood Count 8.4 x10^3/uL  8.0 x10^3/uL  8.1 x10^3/uL 


 


Red Blood Count 3.26 x10^6/uL  3.38 x10^6/uL  3.42 x10^6/uL 


 


Hemoglobin 7.0 g/dL  7.6 g/dL  7.8 g/dL 


 


Hematocrit 22.5 %  23.8 %  24.3 % 


 


Mean Corpuscular Volume 69 fL  70 fL  71 fL 


 


Mean Corpuscular Hemoglobin 21 pg  22 pg  23 pg 


 


Mean Corpuscular Hemoglobin


Concent 31 g/dL 


 32 g/dL 


 32 g/dL 





 


Red Cell Distribution Width 21.4 %  22.4 %  22.9 % 


 


Platelet Count 293 x10^3/uL  273 x10^3/uL  266 x10^3/uL 


 


Neutrophils (%) (Auto)   62 % 


 


Lymphocytes (%) (Auto)   24 % 


 


Monocytes (%) (Auto)   10 % 


 


Eosinophils (%) (Auto)   2 % 


 


Basophils (%) (Auto)   2 % 


 


Neutrophils # (Auto)   5.0 x10^3/uL 


 


Lymphocytes # (Auto)   1.9 x10^3/uL 


 


Monocytes # (Auto)   0.8 x10^3/uL 


 


Eosinophils # (Auto)   0.2 x10^3/uL 


 


Basophils # (Auto)   0.1 x10^3/uL 


 


Sodium Level   145 mmol/L 


 


Potassium Level   3.3 mmol/L 


 


Chloride Level   108 mmol/L 


 


Carbon Dioxide Level   27 mmol/L 


 


Anion Gap   10 


 


Blood Urea Nitrogen   4 mg/dL 


 


Creatinine   1.0 mg/dL 


 


Estimated GFR


(Cockcroft-Gault) 


 


 95.3 





 


Glucose Level   87 mg/dL 


 


Calcium Level   8.9 mg/dL 








  BLOOD CULTURE  Preliminary  


        NO GROWTH AFTER 2 DAYS


Imaging:


EGD 10/7


E--Normal.  GEJ at 41cm.


G--Folds normal and pliable, not pathologic.  Biopsies antrum.


D--Deep scar/shallow active ulcer, bulb.  Biopsies second portion.





PE:





GEN: NAD - was asleep


LUNGS: CTAB


HEART: RRR


ABD: S/ND/NT


NEURO/PSYCH: drowsy





A/P:


Abd pain, anemia


DU


+cannabinoids +cocaine


CRC screen - none





--


Continue PPI, add iron.  Follow-up re: path.  Outpt colonoscopy.











EMILIA SUAZO          Oct 8, 2019 11:11

## 2019-10-08 NOTE — NUR
pt discharged home with family. IV dc'd cath intact.  meds and follow up reviewed.  pt v/u. 
pt stable upon dc

## 2019-10-08 NOTE — PDOC
PROGRESS NOTES


History of Present Illness


History of Present Illness





Images


Images


CT abdomen - Heart is normal in size. No pericardial or pleural effusion. Clear 

lung bases. Liver, spleen, gallbladder, pancreas, adrenals and kidneys are 

within normal limits. No enlarged retroperitoneal or pelvic adenopathy. No free 

pelvic fluid or ascites. The prostate and seminal vesicles show no large mass. 

No bowel obstruction. Normal appendix. Gastric wall thickening noted measuring 

2.0 cm. Urinary bladder demonstrates no radiopaque stone. No pneumoperitoneum. 

No suspicious bony lesion.


 


IMPRESSION:


1. Wall thickening of the stomach may be secondary to suboptimal distention or 

gastritis.


2. No cholelithiasis area no nephrolithiasis.





VTE Prophylaxis Ordered


VTE Prophylaxis Devices:  Yes


VTE Pharmacological Prophylaxi:  Contraindicated





DISCHARGE DX ================











A/P:


Acute epigastric abdominal pain - ///peptic ulcer disease recurrence. IV PPI,


 consult GI. Pain control. NPO


Acute blood loss anemia - with recent melena and Hb 7.1, likely peptic ulcer 

bleeding. 


 transfuse if necessary if Hb < 7


Lactic acidosis - likely 2/2 blood loss, will give IVF and trend


H/o PUD - no known h pylori history


cocaine abuse


noncompliance


Hypertrophic gastropathy from H.pylori (vs carcinoma or lymphoma) especially 

with h/o ulcer.  








FEN - NPO


PPX - SCDs, IV PPI


FULL CODE


GI CONSULT


Dispo - inpatient for intractable abdominal pain and obvious GI bleed


egd








Indication: abdominal pain/anemia/abnormal CT





Meds: per anesthesia





Findings:





E--Normal.  GEJ at 41cm.


G--Folds normal and pliable, not pathologic.  Biopsies antrum.


D--Deep scar/shallow active ulcer, bulb.  Biopsies second portion.





Dasia. well.





IMP: DU/scar


        Suspect will have active gastritis +/- H.pylori seen.





REC: PPI


         Await path.


         OK to feed.











KEIRY EL MD 


10/07 














34 min pt exam, chart review D/C PLANNING , > 50% of time spent with exam, chart

review, pt care coordination





Vitals


Vitals





Vital Signs








  Date Time  Temp Pulse Resp B/P (MAP) Pulse Ox O2 Delivery O2 Flow Rate FiO2


 


10/8/19 08:00      Room Air  


 


10/8/19 07:00 98.0 70 16 158/96 (116) 100   





 98.0       











Physical Exam


General:  Alert, Oriented X3, Cooperative, No acute distress


Heart:  Regular rate, Normal S1


Lungs:  Clear


Abdomen:  Normal bowel sounds, Soft, No hepatosplenomegaly, No masses, Other 

(epigastric tenderness)


Extremities:  No clubbing, No cyanosis, No edema, Normal pulses, No tenderness/

swelling


Skin:  No rashes, No breakdown, No significant lesion





Labs


LABS





Laboratory Tests








Test


 10/7/19


13:50 10/7/19


22:20 10/8/19


03:48


 


White Blood Count


 8.4 x10^3/uL


(4.0-11.0) 8.0 x10^3/uL


(4.0-11.0) 8.1 x10^3/uL


(4.0-11.0)


 


Red Blood Count


 3.26 x10^6/uL


(4.30-5.70) 3.38 x10^6/uL


(4.30-5.70) 3.42 x10^6/uL


(4.30-5.70)


 


Hemoglobin


 7.0 g/dL


(13.0-17.5) 7.6 g/dL


(13.0-17.5) 7.8 g/dL


(13.0-17.5)


 


Hematocrit


 22.5 %


(39.0-53.0) 23.8 %


(39.0-53.0) 24.3 %


(39.0-53.0)


 


Mean Corpuscular Volume 69 fL ()  70 fL ()  71 fL () 


 


Mean Corpuscular Hemoglobin 21 pg (25-35)  22 pg (25-35)  23 pg (25-35) 


 


Mean Corpuscular Hemoglobin


Concent 31 g/dL


(31-37) 32 g/dL


(31-37) 32 g/dL


(31-37)


 


Red Cell Distribution Width


 21.4 %


(11.5-14.5) 22.4 %


(11.5-14.5) 22.9 %


(11.5-14.5)


 


Platelet Count


 293 x10^3/uL


(140-400) 273 x10^3/uL


(140-400) 266 x10^3/uL


(140-400)


 


Neutrophils (%) (Auto)   62 % (31-73) 


 


Lymphocytes (%) (Auto)   24 % (24-48) 


 


Monocytes (%) (Auto)   10 % (0-9) 


 


Eosinophils (%) (Auto)   2 % (0-3) 


 


Basophils (%) (Auto)   2 % (0-3) 


 


Neutrophils # (Auto)


 


 


 5.0 x10^3/uL


(1.8-7.7)


 


Lymphocytes # (Auto)


 


 


 1.9 x10^3/uL


(1.0-4.8)


 


Monocytes # (Auto)


 


 


 0.8 x10^3/uL


(0.0-1.1)


 


Eosinophils # (Auto)


 


 


 0.2 x10^3/uL


(0.0-0.7)


 


Basophils # (Auto)


 


 


 0.1 x10^3/uL


(0.0-0.2)


 


Sodium Level


 


 


 145 mmol/L


(136-145)


 


Potassium Level


 


 


 3.3 mmol/L


(3.5-5.1)


 


Chloride Level


 


 


 108 mmol/L


()


 


Carbon Dioxide Level


 


 


 27 mmol/L


(21-32)


 


Anion Gap   10 (6-14) 


 


Blood Urea Nitrogen   4 mg/dL (8-26) 


 


Creatinine


 


 


 1.0 mg/dL


(0.7-1.3)


 


Estimated GFR


(Cockcroft-Gault) 


 


 95.3 





 


Glucose Level


 


 


 87 mg/dL


(70-99)


 


Calcium Level


 


 


 8.9 mg/dL


(8.5-10.1)











Assessment and Plan


Assessmemt and Plan


Problems


Medical Problems:


(1) Abdominal pain


Status: Acute  





(2) Anemia


Status: Acute  





(3) Cocaine abuse


Status: Acute  





(4) Diarrhea


Status: Acute  





(5) Marijuana abuse


Status: Acute  











Comment


Review of Relevant


I have reviewed the following items larissa (where applicable) has been applied.


Labs





Laboratory Tests








Test


 10/6/19


21:40 10/7/19


04:10 10/7/19


13:50 10/7/19


22:20


 


White Blood Count


 8.2 x10^3/uL


(4.0-11.0) 8.4 x10^3/uL


(4.0-11.0) 8.4 x10^3/uL


(4.0-11.0) 8.0 x10^3/uL


(4.0-11.0)


 


Red Blood Count


 3.38 x10^6/uL


(4.30-5.70) 3.30 x10^6/uL


(4.30-5.70) 3.26 x10^6/uL


(4.30-5.70) 3.38 x10^6/uL


(4.30-5.70)


 


Hemoglobin


 7.1 g/dL


(13.0-17.5) 7.1 g/dL


(13.0-17.5) 7.0 g/dL


(13.0-17.5) 7.6 g/dL


(13.0-17.5)


 


Hematocrit


 23.1 %


(39.0-53.0) 22.8 %


(39.0-53.0) 22.5 %


(39.0-53.0) 23.8 %


(39.0-53.0)


 


Mean Corpuscular Volume 68 fL ()  69 fL ()  69 fL ()  70 fL 

() 


 


Mean Corpuscular Hemoglobin 21 pg (25-35)  22 pg (25-35)  21 pg (25-35)  22 pg 

(25-35) 


 


Mean Corpuscular Hemoglobin


Concent 31 g/dL


(31-37) 31 g/dL


(31-37) 31 g/dL


(31-37) 32 g/dL


(31-37)


 


Red Cell Distribution Width


 20.9 %


(11.5-14.5) 21.1 %


(11.5-14.5) 21.4 %


(11.5-14.5) 22.4 %


(11.5-14.5)


 


Platelet Count


 312 x10^3/uL


(140-400) 286 x10^3/uL


(140-400) 293 x10^3/uL


(140-400) 273 x10^3/uL


(140-400)


 


Test


 10/8/19


03:48 


 


 





 


White Blood Count


 8.1 x10^3/uL


(4.0-11.0) 


 


 





 


Red Blood Count


 3.42 x10^6/uL


(4.30-5.70) 


 


 





 


Hemoglobin


 7.8 g/dL


(13.0-17.5) 


 


 





 


Hematocrit


 24.3 %


(39.0-53.0) 


 


 





 


Mean Corpuscular Volume 71 fL ()    


 


Mean Corpuscular Hemoglobin 23 pg (25-35)    


 


Mean Corpuscular Hemoglobin


Concent 32 g/dL


(31-37) 


 


 





 


Red Cell Distribution Width


 22.9 %


(11.5-14.5) 


 


 





 


Platelet Count


 266 x10^3/uL


(140-400) 


 


 





 


Neutrophils (%) (Auto) 62 % (31-73)    


 


Lymphocytes (%) (Auto) 24 % (24-48)    


 


Monocytes (%) (Auto) 10 % (0-9)    


 


Eosinophils (%) (Auto) 2 % (0-3)    


 


Basophils (%) (Auto) 2 % (0-3)    


 


Neutrophils # (Auto)


 5.0 x10^3/uL


(1.8-7.7) 


 


 





 


Lymphocytes # (Auto)


 1.9 x10^3/uL


(1.0-4.8) 


 


 





 


Monocytes # (Auto)


 0.8 x10^3/uL


(0.0-1.1) 


 


 





 


Eosinophils # (Auto)


 0.2 x10^3/uL


(0.0-0.7) 


 


 





 


Basophils # (Auto)


 0.1 x10^3/uL


(0.0-0.2) 


 


 





 


Sodium Level


 145 mmol/L


(136-145) 


 


 





 


Potassium Level


 3.3 mmol/L


(3.5-5.1) 


 


 





 


Chloride Level


 108 mmol/L


() 


 


 





 


Carbon Dioxide Level


 27 mmol/L


(21-32) 


 


 





 


Anion Gap 10 (6-14)    


 


Blood Urea Nitrogen 4 mg/dL (8-26)    


 


Creatinine


 1.0 mg/dL


(0.7-1.3) 


 


 





 


Estimated GFR


(Cockcroft-Gault) 95.3 


 


 


 





 


Glucose Level


 87 mg/dL


(70-99) 


 


 





 


Calcium Level


 8.9 mg/dL


(8.5-10.1) 


 


 











Laboratory Tests








Test


 10/7/19


13:50 10/7/19


22:20 10/8/19


03:48


 


White Blood Count


 8.4 x10^3/uL


(4.0-11.0) 8.0 x10^3/uL


(4.0-11.0) 8.1 x10^3/uL


(4.0-11.0)


 


Red Blood Count


 3.26 x10^6/uL


(4.30-5.70) 3.38 x10^6/uL


(4.30-5.70) 3.42 x10^6/uL


(4.30-5.70)


 


Hemoglobin


 7.0 g/dL


(13.0-17.5) 7.6 g/dL


(13.0-17.5) 7.8 g/dL


(13.0-17.5)


 


Hematocrit


 22.5 %


(39.0-53.0) 23.8 %


(39.0-53.0) 24.3 %


(39.0-53.0)


 


Mean Corpuscular Volume 69 fL ()  70 fL ()  71 fL () 


 


Mean Corpuscular Hemoglobin 21 pg (25-35)  22 pg (25-35)  23 pg (25-35) 


 


Mean Corpuscular Hemoglobin


Concent 31 g/dL


(31-37) 32 g/dL


(31-37) 32 g/dL


(31-37)


 


Red Cell Distribution Width


 21.4 %


(11.5-14.5) 22.4 %


(11.5-14.5) 22.9 %


(11.5-14.5)


 


Platelet Count


 293 x10^3/uL


(140-400) 273 x10^3/uL


(140-400) 266 x10^3/uL


(140-400)


 


Neutrophils (%) (Auto)   62 % (31-73) 


 


Lymphocytes (%) (Auto)   24 % (24-48) 


 


Monocytes (%) (Auto)   10 % (0-9) 


 


Eosinophils (%) (Auto)   2 % (0-3) 


 


Basophils (%) (Auto)   2 % (0-3) 


 


Neutrophils # (Auto)


 


 


 5.0 x10^3/uL


(1.8-7.7)


 


Lymphocytes # (Auto)


 


 


 1.9 x10^3/uL


(1.0-4.8)


 


Monocytes # (Auto)


 


 


 0.8 x10^3/uL


(0.0-1.1)


 


Eosinophils # (Auto)


 


 


 0.2 x10^3/uL


(0.0-0.7)


 


Basophils # (Auto)


 


 


 0.1 x10^3/uL


(0.0-0.2)


 


Sodium Level


 


 


 145 mmol/L


(136-145)


 


Potassium Level


 


 


 3.3 mmol/L


(3.5-5.1)


 


Chloride Level


 


 


 108 mmol/L


()


 


Carbon Dioxide Level


 


 


 27 mmol/L


(21-32)


 


Anion Gap   10 (6-14) 


 


Blood Urea Nitrogen   4 mg/dL (8-26) 


 


Creatinine


 


 


 1.0 mg/dL


(0.7-1.3)


 


Estimated GFR


(Cockcroft-Gault) 


 


 95.3 





 


Glucose Level


 


 


 87 mg/dL


(70-99)


 


Calcium Level


 


 


 8.9 mg/dL


(8.5-10.1)








Microbiology


10/6/19 Blood Culture - Preliminary, Resulted


          NO GROWTH AFTER 2 DAYS


Medications





Current Medications


Fentanyl Citrate (Fentanyl 2ml Vial) 50 mcg 1X  ONCE IV  Last administered on 

10/6/19at 07:00;  Start 10/6/19 at 07:00;  Stop 10/6/19 at 07:01;  Status DC


Ondansetron HCl (Zofran) 4 mg 1X  ONCE IV  Last administered on 10/6/19at 07:47;

 Start 10/6/19 at 07:00;  Stop 10/6/19 at 07:01;  Status DC


Sodium Chloride 1,000 ml @  1,000 mls/hr 1X  ONCE IV  Last administered on 

10/6/19at 08:25;  Start 10/6/19 at 07:00;  Stop 10/6/19 at 07:59;  Status DC


Sodium Chloride 1,000 ml @  1,000 mls/hr 1X  ONCE IV  Last administered on 

10/6/19at 09:42;  Start 10/6/19 at 07:00;  Stop 10/6/19 at 07:59;  Status DC


Iohexol (Omnipaque 300 Mg/ml) 60 ml 1X  ONCE IV  Last administered on 10/6/19at 

07:15;  Start 10/6/19 at 07:15;  Stop 10/6/19 at 07:16;  Status DC


Info (CONTRAST GIVEN -- Rx MONITORING) 1 each PRN DAILY  PRN MC SEE COMMENTS;  

Start 10/6/19 at 07:30;  Stop 10/8/19 at 07:29;  Status DC


Ceftriaxone Sodium (Rocephin) 1 gm 1X  ONCE IVP  Last administered on 10/6/19at 

17:27;  Start 10/6/19 at 07:45;  Stop 10/6/19 at 07:46;  Status DC


Vancomycin HCl 250 ml @  250 mls/hr 1X  ONCE IV  Last administered on 10/6/19at 

17:27;  Start 10/6/19 at 07:45;  Stop 10/6/19 at 08:44;  Status DC


Ondansetron HCl (Zofran) 4 mg PRN Q8HRS  PRN IV NAUSEA/VOMITING;  Start 10/6/19 

at 08:00;  Stop 10/6/19 at 12:00;  Status DC


Fentanyl Citrate (Fentanyl 2ml Vial) 50 mcg PRN Q2HR  PRN IV PAIN Last 

administered on 10/6/19at 09:46;  Start 10/6/19 at 08:00;  Stop 10/6/19 at 

12:00;  Status DC


Sodium Chloride 1,000 ml @  150 mls/hr Q6H40M IV  Last administered on 10/7/19at

01:32;  Start 10/6/19 at 07:57;  Stop 10/7/19 at 07:56;  Status DC


Pantoprazole Sodium (PROTONIX VIAL for IV PUSH) 40 mg 1X  ONCE IVP  Last 

administered on 10/6/19at 12:40;  Start 10/6/19 at 08:00;  Stop 10/6/19 at 

08:06;  Status DC


Morphine Sulfate (Morphine Sulfate) 4 mg PRN Q2HR  PRN IV PAIN Last administered

on 10/8/19at 07:46;  Start 10/6/19 at 09:00


Iron Sucrose 500 mg/Sodium Chloride 275 ml @  78.571 mls/ hr 1X  ONCE IV  Last 

administered on 10/6/19at 20:34;  Start 10/6/19 at 15:30;  Stop 10/6/19 at 18:59

;  Status DC


Pantoprazole Sodium (PROTONIX VIAL for IV PUSH) 40 mg DAILYAC IVP  Last 

administered on 10/7/19at 08:54;  Start 10/6/19 at 16:30;  Stop 10/7/19 at 

14:56;  Status DC


Ondansetron HCl (Zofran) 4 mg PRN Q6HRS  PRN IVP NAUSEA/VOMITING;  Start 10/6/19

at 15:30


Midazolam HCl (Versed) 2 mg PRN 1X  PRN IV PRIOR TO PROCEDURE;  Start 10/7/19 at

14:30;  Stop 10/8/19 at 14:29


Fentanyl Citrate (Fentanyl 2ml Vial) 25 mcg PRN Q5MIN  PRN IV X 2 DOSES FOR 

PAIN;  Start 10/7/19 at 14:30;  Stop 10/8/19 at 14:29


Fentanyl Citrate (Fentanyl 2ml Vial) 50 mcg PRN Q5MIN  PRN IV X 2 DOSES FOR 

PAIN;  Start 10/7/19 at 14:30;  Stop 10/8/19 at 14:29


Ringer's Solution 1,000 ml @  125 mls/hr Q8H IV ;  Start 10/7/19 at 14:28;  Stop

10/8/19 at 02:27;  Status DC


Lidocaine HCl (Xylocaine-Mpf 1% 2ml Vial) 2 ml 1X PRN  PRN ID IV START;  Start 

10/7/19 at 14:30;  Stop 10/8/19 at 14:29


Propofol 40 ml @ As Directed STK-MED ONCE IV ;  Start 10/7/19 at 14:37;  Stop 

10/7/19 at 14:37;  Status DC


Lidocaine HCl (Lidocaine Pf 2% Vial) 5 ml STK-MED ONCE .ROUTE ;  Start 10/7/19 

at 14:37;  Stop 10/7/19 at 14:37;  Status DC


Pantoprazole Sodium (Protonix) 40 mg DAILYAC PO  Last administered on 10/8/19at 

07:45;  Start 10/8/19 at 07:30


Influenza Virus Vaccine Quadrival (Afluria Quad 2019-20 (3yr Up) Syringe) 0.5 ml

ONCE ONCE VAX IM ;  Start 10/8/19 at 12:00;  Stop 10/8/19 at 12:01


Acetaminophen/ Hydrocodone Bitart (Lortab 5/325) 1 tab PRN Q4HRS  PRN PO PAIN 

Last administered on 10/8/19at 10:00;  Start 10/8/19 at 09:45


Vitals/I & O





Vital Sign - Last 24 Hours








 10/7/19 10/7/19 10/7/19 10/7/19





 11:00 14:21 14:25 15:00


 


Temp 97.5 98.3  97.8





 97.5 98.3  97.8


 


Pulse 81 75  84


 


Resp 17 18  18


 


B/P (MAP) 73/   150/83 (105)


 


Pulse Ox 97 98  98


 


O2 Delivery Room Air  Room Air Room Air


 


    





    





 10/7/19 10/7/19 10/7/19 10/7/19





 15:00 15:15 16:56 17:11


 


Temp 98.8  97.4 97.9





 98.8  97.4 97.9


 


Pulse 83 78 81 87


 


Resp 16 18 18 18


 


B/P (MAP) 125/71 163/80 148/81 161/107


 


Pulse Ox 100 97  


 


O2 Delivery Room Air Room Air  


 


    





    





 10/7/19 10/7/19 10/7/19 10/7/19





 18:11 19:00 19:11 20:00


 


Temp 98.2 98.1 97.9 





 98.2 98.1 97.9 


 


Pulse 85 86 83 


 


Resp 18 20 14 


 


B/P (MAP) 134/78 150/94 (112) 153/89 


 


Pulse Ox  99  


 


O2 Delivery  Room Air  Room Air


 


    





    





 10/7/19 10/7/19 10/7/19 10/7/19





 20:30 20:47 21:17 23:00


 


Temp 97.4   98.1





 97.4   98.1


 


Pulse 73   83


 


Resp 16   20


 


B/P (MAP) 150/94   93/49 (64)


 


Pulse Ox  97 97 99


 


O2 Delivery  Room Air Room Air Room Air


 


    





    





 10/8/19 10/8/19 10/8/19 10/8/19





 01:48 03:08 07:00 08:00


 


Temp  98.0 98.0 





  98.0 98.0 


 


Pulse  74 70 


 


Resp  20 16 


 


B/P (MAP)  144/99 (114) 158/96 (116) 


 


Pulse Ox 99 98 100 


 


O2 Delivery Room Air Room Air Room Air Room Air














Intake and Output   


 


 10/7/19 10/7/19 10/8/19





 15:00 23:00 07:00


 


Intake Total  554 ml 300 ml


 


Balance  554 ml 300 ml

















YEHUDA SOLO MD           Oct 8, 2019 10:49

## 2019-10-09 NOTE — PATHOLOGY
[facility] Accession Number: [id number]

.                                                                01

Material submitted:                                        .

PART A: duodenum - SECOND PORTION OF DUODENUM

PART B: stomach - ANTRAL

.                                                                01

Clinical history:                                          .

Anemia

.                                                                02

**********************************************************************

Diagnosis:

A.  Duodenal biopsies, second portion of duodenum:

- Mild nonspecific duodenitis.

.

B.  Gastric biopsies, antrum:

- Marked active chronic gastritis, with few Helicobacter organisms

identified.

(JPM:miles; 10/09/2019)

S  10/09/2019  0936 Local

**********************************************************************

.                                                                02

Comment:

Sections of the second portion of the duodenum biopsy reveal segments of

small intestine mucosa which show mild chronic inflammation with a few

scattered admixed neutrophils.  Where best oriented, the mucosa villi show

no sprue-like changes.

.

Sections of the gastric antral biopsy show marked active chronic

inflammation.  A properly controlled immunoperoxidase stain for

Helicobacter focally reveals a few Helicobacter organisms.  There is no

evidence of malignancy.

([facility]:miles; 10/09/2019)

.

.

Special stain performed:  Immunoperoxidase stain for Helicobacter

.                                                                02

Electronically signed:                                     .

Ivan Lane MD, Pathologist

NPI- [id number]

.                                                                01

Gross description:                                         .

A. The specimen is received in formalin, labeled "David Amezquita, second

portion of duodenum BX", are few irregular fragments of tan soft tissue

measuring 0.7 x 0.5 x 0.1 cm in aggregate.  Entirely submitted in A1.

.

B. The specimen is received in formalin, labeled "Amezquita, David, antral

BX", are two irregular segments of tan soft tissue measuring 0.4 cm and

0.7 cm in greatest dimension.  Entirely submitted in B1.

([facility]; 10/8/2019)

[facility]  10/08/2019  1729 Local

.                                                                02

Pathologist provided ICD-10:

K29.80, K29.50, B96.81

.                                                                02

CPT                                                        .

075603, 199065, L23616

Specimen Comment: A courtesy copy of this report has been sent to

Specimen Comment: [phone], [phone].

Specimen Comment: Report sent to  / DR THORPE

***Performed at:  01

   [facility], [place]  [id number]

   MD Oniel Trimble MD Phone:  [phone]

***Performed at:  02

   [facility]

   [address] [place], [place]  [id number]

   MD Ivan Lane MD Phone:  [phone]